# Patient Record
Sex: FEMALE | Race: WHITE | NOT HISPANIC OR LATINO | Employment: UNEMPLOYED | URBAN - METROPOLITAN AREA
[De-identification: names, ages, dates, MRNs, and addresses within clinical notes are randomized per-mention and may not be internally consistent; named-entity substitution may affect disease eponyms.]

---

## 2017-01-18 ENCOUNTER — TRANSCRIBE ORDERS (OUTPATIENT)
Dept: ADMINISTRATIVE | Facility: HOSPITAL | Age: 22
End: 2017-01-18

## 2017-01-18 ENCOUNTER — APPOINTMENT (OUTPATIENT)
Dept: LAB | Facility: HOSPITAL | Age: 22
End: 2017-01-18
Payer: COMMERCIAL

## 2017-01-18 DIAGNOSIS — J45.31 MILD PERSISTENT ASTHMA WITH EXACERBATION: Primary | ICD-10-CM

## 2017-01-18 DIAGNOSIS — J45.31 MILD PERSISTENT ASTHMA WITH EXACERBATION: ICD-10-CM

## 2017-01-18 DIAGNOSIS — J06.9 ACUTE UPPER RESPIRATORY INFECTIONS OF OTHER MULTIPLE SITES: ICD-10-CM

## 2017-01-18 DIAGNOSIS — J06.9 ACUTE UPPER RESPIRATORY INFECTIONS OF OTHER MULTIPLE SITES: Primary | ICD-10-CM

## 2017-01-18 LAB
FLUAV AG SPEC QL: DETECTED
FLUBV AG SPEC QL: ABNORMAL
RSV B RNA SPEC QL NAA+PROBE: ABNORMAL

## 2017-01-18 PROCEDURE — 86308 HETEROPHILE ANTIBODY SCREEN: CPT

## 2017-01-18 PROCEDURE — 36415 COLL VENOUS BLD VENIPUNCTURE: CPT

## 2017-01-18 PROCEDURE — 86738 MYCOPLASMA ANTIBODY: CPT

## 2017-01-18 PROCEDURE — 87798 DETECT AGENT NOS DNA AMP: CPT

## 2017-01-19 LAB — HETEROPH AB SER QL: NEGATIVE

## 2017-01-20 LAB
M PNEUMO IGG SER IA-ACNC: 435 U/ML (ref 0–99)
M PNEUMO IGM SER IA-ACNC: <770 U/ML (ref 0–769)

## 2017-08-02 ENCOUNTER — ALLSCRIPTS OFFICE VISIT (OUTPATIENT)
Dept: OTHER | Facility: OTHER | Age: 22
End: 2017-08-02

## 2017-09-25 ENCOUNTER — GENERIC CONVERSION - ENCOUNTER (OUTPATIENT)
Dept: OTHER | Facility: OTHER | Age: 22
End: 2017-09-25

## 2017-10-09 ENCOUNTER — ALLSCRIPTS OFFICE VISIT (OUTPATIENT)
Dept: OTHER | Facility: OTHER | Age: 22
End: 2017-10-09

## 2017-10-09 ENCOUNTER — GENERIC CONVERSION - ENCOUNTER (OUTPATIENT)
Dept: OTHER | Facility: OTHER | Age: 22
End: 2017-10-09

## 2017-10-09 DIAGNOSIS — Z01.419 ENCOUNTER FOR GYNECOLOGICAL EXAMINATION WITHOUT ABNORMAL FINDING: ICD-10-CM

## 2017-10-16 ENCOUNTER — GENERIC CONVERSION - ENCOUNTER (OUTPATIENT)
Dept: OTHER | Facility: OTHER | Age: 22
End: 2017-10-16

## 2017-10-28 NOTE — PROGRESS NOTES
Assessment    1  Oral contraceptive use (V25 41) (Z30 41)  2  Encounter for annual routine gynecological examination (V72 31) (Z01 419)    Plan  SocHx: Oral contraceptive use    · Changed: From  Junel 1/20 1-20 MG-MCG Oral Tablet TAKE 1 TABLET BY MOUTHDAILY AS DIRECTED To Junel 1/20 1-20 MG-MCG Oral Tablet TAKE 1 TABLET DAILY  Rx By: Marquita Parra; Dispense: 90 Days ; #:90 Tablet; Refill: 3;For: SocHx: Oral contraceptive use; DEONDRE = N; Sent To: [address]    Discussion/Summary  health maintenance visit healthy adult female Currently, she eats a healthy diet  cervical cancer screening is current Breast cancer screening: breast cancer screening is not indicated  Advice and education were given regarding contraception  The patient has the current Goals: Patient is at goal    Patient is able to Self-Care  PATIENT EDUCATION RECORD  Indication for Services: Annual gyn exam    Possible side effects of new medications were reviewed with the patient/guardian today  The treatment plan was reviewed with the patient/guardian  The patient/guardian understands and agrees with the treatment plan     Self Referrals: Yes      Chief Complaint    1  Visit For: Preventive General Multisystem Exam    History of Present Illness  GYN HM, Adult Female [facility]: The patient is being seen for a health maintenance and gynecology evaluation  General Health: The patient's health since the last visit is described as good  She has regular dental visits  -- She denies vision problems  -- She denies hearing loss  Immunizations status: up to date  Lifestyle:  She consumes a diverse and healthy diet  -- She does not have any weight concerns  -- She exercises regularly  -- She does not use tobacco -- She denies alcohol use  -- She denies drug use  Reproductive health: the patient is premenopausal--   she reports no menstrual problems  -- she uses contraception  For contraception, she uses oral contraception pills  -- she is sexually active  -- she denies prior pregnancies  Screening: cancer screening reviewed and current  metabolic screening reviewed and current  risk screening reviewed and current  Additional History: Gloria Chapman Antonieta Santos is new to our practice  She has been using Junel for contraception without any problems or adverse reactions  She is however, switch the like to have it on the Mirena  She has had no significant gyn history and only is being treated for asthma  Review of Systems   Constitutional: No fever, no chills, feels well, no tiredness, no recent weight gain or loss  ENT: no ear ache, no loss of hearing, no nosebleeds or nasal discharge, no sore throat or hoarseness  Cardiovascular: no complaints of slow or fast heart rate, no chest pain, no palpitations, no leg claudication or lower extremity edema  Respiratory: no complaints of shortness of breath, no wheezing, no dyspnea on exertion, no orthopnea or PND  Breasts: no complaints of breast pain, breast lump or nipple discharge  Gastrointestinal: no complaints of abdominal pain, no constipation, no nausea or diarrhea, no vomiting, no bloody stools  Genitourinary: no complaints of dysuria, no incontinence, no pelvic pain, no dysmenorrhea, no vaginal discharge or abnormal vaginal bleeding  Musculoskeletal: no complaints of arthralgia, no myalgia, no joint swelling or stiffness, no limb pain or swelling  Integumentary: no complaints of skin rash or lesion, no itching or dry skin, no skin wounds  Neurological: no complaints of headache, no confusion, no numbness or tingling, no dizziness or fainting  Active Problems  1  History of self breast exam  2  Migraine, unspecified, not intractable, without status migrainosus (346 90) (G43 909)  3  Need for prophylactic vaccination and inoculation against influenza (V04 81) (Z23)  4  Need for Tdap vaccination (V06 1) (Z23)  5   Oral contraceptive use (V25 41) (Z30 41)    Past Medical History   · History of  0 (V47 89)    Surgical History     · History of Oral Surgery Tooth Extraction [place] To[facility]    Family History  Mother    · Denied: Family history of mental disorder   · Denied: Family history of substance abuse  Father    · Denied: Family history of mental disorder   · Denied: Family history of substance abuse   · Family history of Living and Healthy  Grandparent    · Family history of asthma (V17 5) (Z82 5)    Social History     · Always uses seat belt   · Caffeine use (V49 89) (F15 90)   · 1 cup coffee daily   · Has no children   · Has smoke detectors   · Never a smoker   · No drug use   · Occasional alcohol use   · 1-2 beers on the weekend   · Oral contraceptive use (V25 41) (Z30 41)   · Single    Current Meds  1  Junel 1/20 1-20 MG-MCG Oral Tablet; TAKE 1 TABLET BY MOUTH DAILY AS DIRECTED; Therapy: [id number] to (Last Rx:[id number])  Requested for: 08Hzr6350 Ordered  2  Xopenex 0 31 MG/3ML Inhalation Nebulization Solution; Therapy: (AWIBOSPN:23WCH0884) to Recorded    Allergies  1  No Known Drug Allergies  2  Animal dander - Cats  3  No Known Food Allergies    Vitals   Recorded: 84DDJ1144 44:94ZY   Systolic 455, LUE, Sitting   Diastolic 68, LUE, Sitting   Height 5 ft 6 in   Weight 121 lb    BMI Calculated 19 53   BSA Calculated 1 62   LMP [id number]       Physical Exam   Constitutional  General appearance: No acute distress, well appearing and well nourished  Neck  Neck: Normal, supple, trachea midline, no masses  Thyroid: Normal, no thyromegaly  Genitourinary  External genitalia: Normal and no lesions appreciated  Vagina: Normal, no lesions or dryness appreciated  Urethra: Normal    Urethral meatus: Normal    Bladder: Normal, soft, non-tender and no prolapse or masses appreciated  Cervix: Normal, no palpable masses  Examination of the cervix revealed normal findings  A Pap smear was not performed  Bimanual exam findings include a patent cervical os    Uterus: Normal, non-tender, not enlarged, and no palpable masses  Adnexa/parametria: Normal, non-tender and no fullness or masses appreciated  Chest  Breasts: Normal and no dimpling or skin changes noted  Abdomen  Abdomen: Normal, non-tender, and no organomegaly noted  Liver and spleen: No hepatomegaly or splenomegaly  Examination for hernias: No hernias appreciated  Lymphatic  Palpation of lymph nodes in neck, axillae, groin and/or other locations: No lymphadenopathy or masses noted  Skin  Skin and subcutaneous tissue: Normal skin turgor and no rashes  Palpation of skin and subcutaneous tissue: Normal    Psychiatric  Orientation to person, place, and time: Normal    Mood and affect: Normal        Signatures   Electronically signed by :  Jaclyn Mary MD; Oct  9 2017  9:26AM EST                       (Author)

## 2018-01-13 VITALS
HEIGHT: 66 IN | WEIGHT: 125.25 LBS | RESPIRATION RATE: 12 BRPM | HEART RATE: 68 BPM | BODY MASS INDEX: 20.13 KG/M2 | SYSTOLIC BLOOD PRESSURE: 120 MMHG | DIASTOLIC BLOOD PRESSURE: 76 MMHG

## 2018-01-15 VITALS
HEIGHT: 66 IN | WEIGHT: 121 LBS | SYSTOLIC BLOOD PRESSURE: 120 MMHG | BODY MASS INDEX: 19.44 KG/M2 | DIASTOLIC BLOOD PRESSURE: 68 MMHG

## 2018-01-16 NOTE — MISCELLANEOUS
Message   Recorded as Task   Date: 10/09/2017 02:41 PM, Created By: Shawn Stone   Task Name: Care Coordination   Assigned To: Shelley Durbin   Regarding Patient: José Diaz, Status: In Progress   Comment:    Frank Arroyo - 09 Oct 2017 2:41 PM     TASK CREATED  Fredy Velasquez is interested in using a Mirena IUD   Jayy See - 10 Oct 2017 7:55 AM     TASK EDITED  Under [employer] insurance: Mirena iud is covered at 100%  No ded or oop  Patient to pay copay only at time of insertion   Jayy See - 10 Oct 2017 8:29 AM     TASK EDITED  lm for pt   Jayy See - 10 Oct 2017 8:29 AM     TASK IN PROGRESS   Jayy See - 16 Oct 2017 3:06 PM     TASK EDITED  Patient never called back        Active Problems    1  Encounter for annual routine gynecological examination (V72 31) (Z01 419)   2  History of self breast exam   3  Migraine, unspecified, not intractable, without status migrainosus (346 90) (G43 909)   4  Need for prophylactic vaccination and inoculation against influenza (V04 81) (Z23)   5  Need for Tdap vaccination (V06 1) (Z23)   6  Oral contraceptive use (V25 41) (Z30 41)    Current Meds   1  Junel 1/20 1-20 MG-MCG Oral Tablet; TAKE 1 TABLET DAILY; Therapy: 51PPH0075 to (Evaluate:04Oct2018)  Requested for: 25NNX2726; Last   Rx:09Oct2017 Ordered   2  Xopenex 0 31 MG/3ML Inhalation Nebulization Solution (Levalbuterol HCl); Therapy: (BOGVSGSS:[id number]) to Recorded    Allergies    1  No Known Drug Allergies    2  Animal dander - Cats   3  No Known Food Allergies    Signatures   Electronically signed by :  Letitia Hashimoto, ; Oct 16 2017  3:06PM EST                       (Author)

## 2018-01-16 NOTE — MISCELLANEOUS
Message   Date: 25 Sep 2017 11:05 AM EST, Recorded By: Heather Kearns For: Jose Taylor   Reason: Other   New patient's mother called to ask if patient can get a flu shot at her appointment on 10/9/17  Advised usually only given to OB patient's but should ask the day of her appointment  Active Problems    1  Migraine, unspecified, not intractable, without status migrainosus (346 90) (G43 909)   2  Need for prophylactic vaccination and inoculation against influenza (V04 81) (Z23)   3  Need for Tdap vaccination (V06 1) (Z23)   4  Oral contraceptive use (V25 41) (Z30 41)    Current Meds   1  Junel 1/20 1-20 MG-MCG Oral Tablet; TAKE 1 TABLET BY MOUTH DAILY AS   DIRECTED; Therapy: 83VQM8946 to (Last Rx:[id number])  Requested for: 44Xcs6714 Ordered   2  Xopenex 0 31 MG/3ML Inhalation Nebulization Solution (Levalbuterol HCl); Therapy: (HCHXHLDR:29BLD1000) to Recorded    Allergies    1  No Known Drug Allergies    2  Animal dander - Cats   3   No Known Food Allergies    Signatures   Electronically signed by : Brandon Queen, ; Sep 25 2017 11:11AM EST                       (Author)

## 2018-01-22 VITALS — BODY MASS INDEX: 19.38 KG/M2 | WEIGHT: 120.6 LBS | HEIGHT: 66 IN

## 2018-02-19 ENCOUNTER — TELEPHONE (OUTPATIENT)
Dept: FAMILY MEDICINE CLINIC | Facility: CLINIC | Age: 23
End: 2018-02-19

## 2018-02-19 DIAGNOSIS — R51.9 CHRONIC NONINTRACTABLE HEADACHE, UNSPECIFIED HEADACHE TYPE: Primary | ICD-10-CM

## 2018-02-19 DIAGNOSIS — G89.29 CHRONIC NONINTRACTABLE HEADACHE, UNSPECIFIED HEADACHE TYPE: Primary | ICD-10-CM

## 2018-02-19 NOTE — TELEPHONE ENCOUNTER
Mom called, said she spoke to you about another order for Dr Dandre Arreaga  Please enter order, then patient can make appointment   Thank you

## 2018-03-16 ENCOUNTER — OFFICE VISIT (OUTPATIENT)
Dept: FAMILY MEDICINE CLINIC | Facility: CLINIC | Age: 23
End: 2018-03-16
Payer: COMMERCIAL

## 2018-03-16 VITALS
DIASTOLIC BLOOD PRESSURE: 62 MMHG | SYSTOLIC BLOOD PRESSURE: 110 MMHG | WEIGHT: 122.7 LBS | HEART RATE: 84 BPM | HEIGHT: 66 IN | BODY MASS INDEX: 19.72 KG/M2 | RESPIRATION RATE: 14 BRPM

## 2018-03-16 DIAGNOSIS — G43.009 MIGRAINE WITHOUT AURA AND WITHOUT STATUS MIGRAINOSUS, NOT INTRACTABLE: ICD-10-CM

## 2018-03-16 DIAGNOSIS — L72.3 SEBACEOUS CYST: ICD-10-CM

## 2018-03-16 DIAGNOSIS — E55.9 MILD VITAMIN D DEFICIENCY: ICD-10-CM

## 2018-03-16 DIAGNOSIS — R53.83 FATIGUE, UNSPECIFIED TYPE: Primary | ICD-10-CM

## 2018-03-16 PROCEDURE — 99214 OFFICE O/P EST MOD 30 MIN: CPT | Performed by: PHYSICIAN ASSISTANT

## 2018-03-16 RX ORDER — NORETHINDRONE ACETATE AND ETHINYL ESTRADIOL 1; .02 MG/1; MG/1
1 TABLET ORAL DAILY
COMMUNITY
Start: 2017-08-18 | End: 2018-07-26 | Stop reason: SDUPTHER

## 2018-03-16 RX ORDER — ALBUTEROL SULFATE 90 UG/1
2 AEROSOL, METERED RESPIRATORY (INHALATION) EVERY 4 HOURS
COMMUNITY
Start: 2014-06-13 | End: 2020-06-08

## 2018-03-16 RX ORDER — LEVALBUTEROL INHALATION SOLUTION 0.31 MG/3ML
SOLUTION RESPIRATORY (INHALATION)
COMMUNITY
End: 2018-10-22

## 2018-03-16 NOTE — PROGRESS NOTES
Assessment/Plan:    1  Fatigue, unspecified type    - reassurance, most likely due to stress, will check labs for completeness  - CBC and differential; Future  - TSH, 3rd generation with T4 reflex; Future  - Comprehensive metabolic panel; Future    2  Mild vitamin D deficiency    - see above  - Vitamin D 25 hydroxy; Future    3  Migraine without aura and without status migrainosus, not intractable    - per patient request, had already made before appt, [facility]/11/[facility]  - Ambulatory referral to Neurology; Future    4  Sebaceous cyst    - reassurance, monitor if any changes or pain will refer to general surgery    F/u as needed          Subjective:   Chief Complaint   Patient presents with    Neck cyst      Patient ID: Sahil Lawrence is a 20 y o  female  Bump on back of neck for years, recently getting bigger  Not sore, denies discharge  Has been tired past 4-5 months  Waking up still tired, getting 7-8 hours, wakes up wanting to go back to bed  Sometimes with headache  Has an appt to see neurologist because headaches had gotten worse  Seeing Dr Brad Grover on 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  Moving to [employer]  Periods normal, eating poorly, getting regular exercise  The following portions of the patient's history were reviewed and updated as appropriate: allergies, current medications, past family history, past medical history, past social history, past surgical history and problem list     No past medical history on file  Past Surgical History:   Procedure Laterality Date    WISDOM TOOTH EXTRACTION       Family History   Problem Relation Age of Onset    No Known Problems Father     Asthma Other     Mental illness Neg Hx     Substance Abuse Neg Hx      Social History     Social History    Marital status: Single     Spouse name: N/A    Number of children: 0    Years of education: N/A     Occupational History    Not on file       Social History Main Topics  Smoking status: Never Smoker    Smokeless tobacco: Never Used    Alcohol use Yes      Comment: occassional 1-2 beers on the weekend     Drug use: No    Sexual activity: Not on file     Other Topics Concern    Not on file     Social History Narrative    Always uses seat belts    Caffeine use 1 cup coffee daily        Current Outpatient Prescriptions:     albuterol (PROVENTIL HFA,VENTOLIN HFA) 90 mcg/act inhaler, Inhale 2 puffs every 4 (four) hours, Disp: , Rfl:     norethindrone-ethinyl estradiol (JUNEL 1/20) 1-20 MG-MCG per tablet, Take 1 tablet by mouth daily, Disp: , Rfl:     levalbuterol (XOPENEX) 0 31 mg/3 mL nebulizer solution, Inhale, Disp: , Rfl:     Review of Systems          Objective:    Vitals:    03/16/18 0910   BP: 110/62   BP Location: Right arm   Patient Position: Sitting   Cuff Size: Standard   Pulse: 84   Resp: 14   Weight: 55 7 kg (122 lb 11 2 oz)   Height: 5' 6" (1 676 m)        Physical Exam   Constitutional: She is oriented to person, place, and time  She appears well-developed and well-nourished  Cardiovascular: Normal rate, regular rhythm and normal heart sounds  Pulmonary/Chest: Effort normal and breath sounds normal    Lymphadenopathy:     She has no cervical adenopathy  Neurological: She is alert and oriented to person, place, and time  Skin:   Left side neck posterior soft freely movable mass about 3 mm with central necrotic plug   Psychiatric: She has a normal mood and affect

## 2018-04-09 ENCOUNTER — TELEPHONE (OUTPATIENT)
Dept: NEUROLOGY | Facility: CLINIC | Age: 23
End: 2018-04-09

## 2018-04-10 ENCOUNTER — LAB (OUTPATIENT)
Dept: LAB | Facility: CLINIC | Age: 23
End: 2018-04-10
Payer: COMMERCIAL

## 2018-04-10 ENCOUNTER — TRANSCRIBE ORDERS (OUTPATIENT)
Dept: LAB | Facility: CLINIC | Age: 23
End: 2018-04-10

## 2018-04-10 ENCOUNTER — OFFICE VISIT (OUTPATIENT)
Dept: NEUROLOGY | Facility: CLINIC | Age: 23
End: 2018-04-10
Payer: COMMERCIAL

## 2018-04-10 VITALS
SYSTOLIC BLOOD PRESSURE: 110 MMHG | WEIGHT: 122 LBS | HEIGHT: 66 IN | BODY MASS INDEX: 19.61 KG/M2 | HEART RATE: 96 BPM | DIASTOLIC BLOOD PRESSURE: 64 MMHG

## 2018-04-10 DIAGNOSIS — G43.009 MIGRAINE WITHOUT AURA AND WITHOUT STATUS MIGRAINOSUS, NOT INTRACTABLE: ICD-10-CM

## 2018-04-10 DIAGNOSIS — R53.83 FATIGUE, UNSPECIFIED TYPE: ICD-10-CM

## 2018-04-10 DIAGNOSIS — R51.9 MORNING HEADACHE: Primary | ICD-10-CM

## 2018-04-10 DIAGNOSIS — E55.9 MILD VITAMIN D DEFICIENCY: ICD-10-CM

## 2018-04-10 LAB
25(OH)D3 SERPL-MCNC: 14.6 NG/ML (ref 30–100)
ALBUMIN SERPL BCP-MCNC: 4 G/DL (ref 3.5–5)
ALP SERPL-CCNC: 51 U/L (ref 46–116)
ALT SERPL W P-5'-P-CCNC: 25 U/L (ref 12–78)
ANION GAP SERPL CALCULATED.3IONS-SCNC: 6 MMOL/L (ref 4–13)
AST SERPL W P-5'-P-CCNC: 12 U/L (ref 5–45)
BASOPHILS # BLD AUTO: 0.02 THOUSANDS/ΜL (ref 0–0.1)
BASOPHILS NFR BLD AUTO: 0 % (ref 0–1)
BILIRUB SERPL-MCNC: 0.2 MG/DL (ref 0.2–1)
BUN SERPL-MCNC: 11 MG/DL (ref 5–25)
CALCIUM SERPL-MCNC: 8.7 MG/DL
CHLORIDE SERPL-SCNC: 104 MMOL/L (ref 100–108)
CO2 SERPL-SCNC: 29 MMOL/L (ref 21–32)
CREAT SERPL-MCNC: 0.73 MG/DL (ref 0.6–1.3)
EOSINOPHIL # BLD AUTO: 0.19 THOUSAND/ΜL (ref 0–0.61)
EOSINOPHIL NFR BLD AUTO: 2 % (ref 0–6)
ERYTHROCYTE [DISTWIDTH] IN BLOOD BY AUTOMATED COUNT: 12.4 % (ref 11.6–15.1)
GFR SERPL CREATININE-BSD FRML MDRD: 116 ML/MIN/1.73SQ M
GLUCOSE SERPL-MCNC: 94 MG/DL (ref 65–140)
HCT VFR BLD AUTO: 43.7 % (ref 34.8–46.1)
HGB BLD-MCNC: 14.4 G/DL (ref 11.5–15.4)
LYMPHOCYTES # BLD AUTO: 3.27 THOUSANDS/ΜL (ref 0.6–4.47)
LYMPHOCYTES NFR BLD AUTO: 37 % (ref 14–44)
MCH RBC QN AUTO: 29.3 PG (ref 26.8–34.3)
MCHC RBC AUTO-ENTMCNC: 33 G/DL (ref 31.4–37.4)
MCV RBC AUTO: 89 FL (ref 82–98)
MONOCYTES # BLD AUTO: 0.47 THOUSAND/ΜL (ref 0.17–1.22)
MONOCYTES NFR BLD AUTO: 5 % (ref 4–12)
NEUTROPHILS # BLD AUTO: 4.83 THOUSANDS/ΜL (ref 1.85–7.62)
NEUTS SEG NFR BLD AUTO: 56 % (ref 43–75)
PLATELET # BLD AUTO: 262 THOUSANDS/UL (ref 149–390)
PMV BLD AUTO: 12.2 FL (ref 8.9–12.7)
POTASSIUM SERPL-SCNC: 3.9 MMOL/L (ref 3.5–5.3)
PROT SERPL-MCNC: 7.6 G/DL (ref 6.4–8.2)
RBC # BLD AUTO: 4.92 MILLION/UL (ref 3.81–5.12)
SODIUM SERPL-SCNC: 139 MMOL/L (ref 136–145)
TSH SERPL DL<=0.05 MIU/L-ACNC: 2.05 UIU/ML (ref 0.36–3.74)
WBC # BLD AUTO: 8.78 THOUSAND/UL (ref 4.31–10.16)

## 2018-04-10 PROCEDURE — 99244 OFF/OP CNSLTJ NEW/EST MOD 40: CPT | Performed by: PSYCHIATRY & NEUROLOGY

## 2018-04-10 PROCEDURE — 84443 ASSAY THYROID STIM HORMONE: CPT

## 2018-04-10 PROCEDURE — 36415 COLL VENOUS BLD VENIPUNCTURE: CPT

## 2018-04-10 PROCEDURE — 82306 VITAMIN D 25 HYDROXY: CPT

## 2018-04-10 PROCEDURE — 85025 COMPLETE CBC W/AUTO DIFF WBC: CPT

## 2018-04-10 PROCEDURE — 80053 COMPREHEN METABOLIC PANEL: CPT

## 2018-04-10 RX ORDER — RIZATRIPTAN BENZOATE 10 MG/1
TABLET, ORALLY DISINTEGRATING ORAL
Qty: 9 TABLET | Refills: 3 | Status: SHIPPED | OUTPATIENT
Start: 2018-04-10 | End: 2018-04-10 | Stop reason: SDUPTHER

## 2018-04-10 RX ORDER — RIZATRIPTAN BENZOATE 10 MG/1
TABLET, ORALLY DISINTEGRATING ORAL
Qty: 9 TABLET | Refills: 0 | Status: SHIPPED | OUTPATIENT
Start: 2018-04-10

## 2018-04-10 NOTE — PROGRESS NOTES
Patient ID: Angeles Guillory is a 20 y o  female  Assessment/Plan:    No problem-specific Assessment & Plan notes found for this encounter  Diagnoses and all orders for this visit:    Morning headache  -     Ambulatory referral to Sleep Medicine; Future    Migraine without aura and without status migrainosus, not intractable  -     Ambulatory referral to Neurology    -     rizatriptan (MAXALT-MLT) 10 MG disintegrating tablet; Take 1 tab at migraine onset, and then can repeat once in 24 hours  Max 2 tabs in 24 hours  Max 2 days a week  - Preventative: Mag ox +/- B2        - Rx alternatives include topamax, gabapentin, protriptyline  Would avoid beta blockers given asthma history  Fatigue, unspecified type  -     Ambulatory referral to Sleep Medicine; Future         Subjective:    HPI    Ms  Angeles Guillory is a pleasant 22 seen in consultation for headache states recalls having them even when she was in elementary school  States worse frequency over time  States headaches can occur anywhere- temporal occipital frontal with no pattern  Associated symptoms: Nausea and vomiting with migraines, photophobia and mild phonophobia,   Denies vertigo or light headedness  Denies aura  Frequency: Wakes up with headaches almost every other day and typically they go away and once a week or two weeks this becomes a severe migraine  Duration: 2 hours for normal headache and migraines >4 hours  Medications tried: Motrin once or twice a week and sometimes helps significantly and sometimes not at all  Imitrex initially helped but not afterward  Naproxen did not helpful  Tried elavil for at least a month- states at least no adverse effects  Did see [facility] neurology 2015 and I have reviewed their notes in care everywhere  She has tried over the counter powder medication which she states main ingredient was chilli powder and this was helpful however they have stopped carrying it    No family history of migraines  Family history of maternal grandmother with stroke at and older age  Other health history of asthma- well controlled  Does report excessive daytime fatigue on awakening, denies snoring or apnea symptoms  Has seen ENT and cleared from their standpoint other than taking allergy medications at times  Blood work pending ordered per PCP  CT head without contrast normal years ago per her  The following portions of the patient's history were reviewed and updated as appropriate: allergies, current medications, past family history, past medical history, past social history, past surgical history and problem list          Objective:    Blood pressure 110/64, pulse 96, height 5' 6" (1 676 m), weight 55 3 kg (122 lb), not currently breastfeeding  Physical Exam   Constitutional: She is oriented to person, place, and time  She appears well-developed and well-nourished  HENT:   Head: Normocephalic and atraumatic  Eyes: EOM are normal  Pupils are equal, round, and reactive to light  Neck: Normal range of motion  Cardiovascular: Normal rate and regular rhythm  Pulmonary/Chest: Effort normal    Abdominal: Soft  Musculoskeletal: She exhibits no tenderness  Neurological: She is alert and oriented to person, place, and time  She has normal strength and normal reflexes  Gait and coordination normal    Nursing note and vitals reviewed  Neurological Exam    Mental Status  The patient is alert and oriented to person, place, time, and situation  Her recent and remote memory are normal  She has no dysarthria  She is able to name object, read and repeat  She has normal attention span and concentration  She follows multi-step commands  She has a normal fund of knowledge      Cranial Nerves    CN II: The patient's visual acuity and visual fields are normal   CN III, IV, VI: The patient's pupils are equally round and reactive to light and ocular movements are normal   CN V: The patient has normal facial sensation  CN VII:  The patient has symmetric facial movement  CN VIII:  The patient's hearing is normal   CN IX, X: The patient has symmetric palate movement and normal gag reflex  CN XI: The patient's shoulder shrug strength is normal   CN XII: The patient's tongue is midline without atrophy or fasciculations  Motor  The patient has normal muscle bulk throughout  Her overall muscle tone is normal throughout  Her strength is 5/5 throughout all four extremities  Sensory  The patient's sensation is normal in all four extremities to light touch, temperature and vibration  She has no right-sided and no left-sided hemispatial neglect  Reflexes  Deep tendon reflexes are 2+ and symmetric in all four extremities with downgoing toes bilaterally  Gait and Coordination  The patient has normal gait and station  She has normal tandem gait  Romberg's sign is negative  She has normal coordination bilaterally  ROS:    Review of Systems   Constitutional: Positive for fatigue  HENT: Negative  Eyes: Negative  Respiratory: Negative  Cardiovascular: Negative  Gastrointestinal: Negative  Endocrine: Negative  Genitourinary: Negative  Musculoskeletal: Negative  Skin: Negative  Neurological: Positive for headaches  Psychiatric/Behavioral: Negative

## 2018-07-24 ENCOUNTER — TELEPHONE (OUTPATIENT)
Dept: FAMILY MEDICINE CLINIC | Facility: CLINIC | Age: 23
End: 2018-07-24

## 2018-07-24 NOTE — TELEPHONE ENCOUNTER
I could order the lab work but she should really probably establish with a PCP up there just to be thorough  With mono you can have an enlarged spleen and you can also get hepatitis  Better to have a PCP follow her for this  If she is having trouble getting into a PCP have her call back

## 2018-07-24 NOTE — TELEPHONE ENCOUNTER
Spoke with mom  She will try to get patient established with someone in [place] but our insurance will make this very difficult  Mom will get back to us if she needs more help with this

## 2018-07-24 NOTE — TELEPHONE ENCOUNTER
Patient has moved to [place]  She was seen at an urgent care on Friday  They go not do labs but told patient by all her symptoms they believe she has mono and patient should contact her family dr to order the labs for patient  Would you be willing to do this?

## 2018-07-26 ENCOUNTER — TRANSCRIBE ORDERS (OUTPATIENT)
Dept: ADMINISTRATIVE | Facility: HOSPITAL | Age: 23
End: 2018-07-26

## 2018-07-26 DIAGNOSIS — G47.9 SLEEP DISORDER: Primary | ICD-10-CM

## 2018-07-26 DIAGNOSIS — Z30.41 ENCOUNTER FOR SURVEILLANCE OF CONTRACEPTIVE PILLS: Primary | ICD-10-CM

## 2018-07-26 RX ORDER — NORETHINDRONE ACETATE AND ETHINYL ESTRADIOL 1; .02 MG/1; MG/1
1 TABLET ORAL DAILY
Qty: 73 TABLET | Refills: 0 | Status: SHIPPED | OUTPATIENT
Start: 2018-07-26 | End: 2018-10-22 | Stop reason: SDUPTHER

## 2018-10-22 ENCOUNTER — OFFICE VISIT (OUTPATIENT)
Dept: SLEEP CENTER | Facility: CLINIC | Age: 23
End: 2018-10-22
Payer: COMMERCIAL

## 2018-10-22 ENCOUNTER — OFFICE VISIT (OUTPATIENT)
Dept: FAMILY MEDICINE CLINIC | Facility: CLINIC | Age: 23
End: 2018-10-22
Payer: COMMERCIAL

## 2018-10-22 ENCOUNTER — APPOINTMENT (OUTPATIENT)
Dept: LAB | Facility: HOSPITAL | Age: 23
End: 2018-10-22
Payer: COMMERCIAL

## 2018-10-22 ENCOUNTER — ANNUAL EXAM (OUTPATIENT)
Dept: OBGYN CLINIC | Facility: CLINIC | Age: 23
End: 2018-10-22
Payer: COMMERCIAL

## 2018-10-22 VITALS
HEART RATE: 80 BPM | HEIGHT: 66 IN | DIASTOLIC BLOOD PRESSURE: 74 MMHG | WEIGHT: 121.6 LBS | BODY MASS INDEX: 19.54 KG/M2 | RESPIRATION RATE: 17 BRPM | SYSTOLIC BLOOD PRESSURE: 110 MMHG

## 2018-10-22 VITALS
DIASTOLIC BLOOD PRESSURE: 70 MMHG | SYSTOLIC BLOOD PRESSURE: 124 MMHG | HEIGHT: 66 IN | BODY MASS INDEX: 19.83 KG/M2 | WEIGHT: 123.4 LBS

## 2018-10-22 VITALS
WEIGHT: 123 LBS | HEIGHT: 66 IN | DIASTOLIC BLOOD PRESSURE: 70 MMHG | SYSTOLIC BLOOD PRESSURE: 106 MMHG | BODY MASS INDEX: 19.77 KG/M2 | HEART RATE: 64 BPM

## 2018-10-22 DIAGNOSIS — G43.009 MIGRAINE WITHOUT AURA AND WITHOUT STATUS MIGRAINOSUS, NOT INTRACTABLE: ICD-10-CM

## 2018-10-22 DIAGNOSIS — R59.9 ADENOPATHY: ICD-10-CM

## 2018-10-22 DIAGNOSIS — Z23 NEED FOR PROPHYLACTIC VACCINATION AND INOCULATION AGAINST INFLUENZA: Primary | ICD-10-CM

## 2018-10-22 DIAGNOSIS — G47.10 HYPERSOMNIA: ICD-10-CM

## 2018-10-22 DIAGNOSIS — G47.30 SLEEP-RELATED BREATHING DISORDER: ICD-10-CM

## 2018-10-22 DIAGNOSIS — R51.9 HEADACHE UPON AWAKENING: Primary | ICD-10-CM

## 2018-10-22 DIAGNOSIS — F41.9 ANXIETY: ICD-10-CM

## 2018-10-22 DIAGNOSIS — G47.9 SLEEP DISORDER: ICD-10-CM

## 2018-10-22 DIAGNOSIS — M25.50 ARTHRALGIA, UNSPECIFIED JOINT: ICD-10-CM

## 2018-10-22 DIAGNOSIS — J45.20 MILD INTERMITTENT ASTHMA WITHOUT COMPLICATION: ICD-10-CM

## 2018-10-22 DIAGNOSIS — G47.00 INSOMNIA, UNSPECIFIED TYPE: ICD-10-CM

## 2018-10-22 DIAGNOSIS — Z01.419 ENCOUNTER FOR GYNECOLOGICAL EXAMINATION WITHOUT ABNORMAL FINDING: Primary | ICD-10-CM

## 2018-10-22 DIAGNOSIS — Z30.41 ENCOUNTER FOR SURVEILLANCE OF CONTRACEPTIVE PILLS: ICD-10-CM

## 2018-10-22 LAB
ALBUMIN SERPL BCP-MCNC: 3.8 G/DL (ref 3.5–5)
ALP SERPL-CCNC: 54 U/L (ref 46–116)
ALT SERPL W P-5'-P-CCNC: 16 U/L (ref 12–78)
ANION GAP SERPL CALCULATED.3IONS-SCNC: 7 MMOL/L (ref 4–13)
AST SERPL W P-5'-P-CCNC: 11 U/L (ref 5–45)
BASOPHILS # BLD AUTO: 0.03 THOUSANDS/ΜL (ref 0–0.1)
BASOPHILS NFR BLD AUTO: 0 % (ref 0–1)
BILIRUB SERPL-MCNC: 0.37 MG/DL (ref 0.2–1)
BUN SERPL-MCNC: 7 MG/DL (ref 5–25)
CALCIUM SERPL-MCNC: 8.4 MG/DL (ref 8.3–10.1)
CHLORIDE SERPL-SCNC: 106 MMOL/L (ref 100–108)
CO2 SERPL-SCNC: 26 MMOL/L (ref 21–32)
CREAT SERPL-MCNC: 0.85 MG/DL (ref 0.6–1.3)
EOSINOPHIL # BLD AUTO: 0.29 THOUSAND/ΜL (ref 0–0.61)
EOSINOPHIL NFR BLD AUTO: 3 % (ref 0–6)
ERYTHROCYTE [DISTWIDTH] IN BLOOD BY AUTOMATED COUNT: 12.5 % (ref 11.6–15.1)
ERYTHROCYTE [SEDIMENTATION RATE] IN BLOOD: 3 MM/HOUR (ref 0–20)
GFR SERPL CREATININE-BSD FRML MDRD: 96 ML/MIN/1.73SQ M
GLUCOSE SERPL-MCNC: 47 MG/DL (ref 65–140)
HCT VFR BLD AUTO: 45 % (ref 34.8–46.1)
HGB BLD-MCNC: 14.5 G/DL (ref 11.5–15.4)
IMM GRANULOCYTES # BLD AUTO: 0.02 THOUSAND/UL (ref 0–0.2)
IMM GRANULOCYTES NFR BLD AUTO: 0 % (ref 0–2)
LYMPHOCYTES # BLD AUTO: 3.81 THOUSANDS/ΜL (ref 0.6–4.47)
LYMPHOCYTES NFR BLD AUTO: 40 % (ref 14–44)
MCH RBC QN AUTO: 29.5 PG (ref 26.8–34.3)
MCHC RBC AUTO-ENTMCNC: 32.2 G/DL (ref 31.4–37.4)
MCV RBC AUTO: 92 FL (ref 82–98)
MONOCYTES # BLD AUTO: 0.57 THOUSAND/ΜL (ref 0.17–1.22)
MONOCYTES NFR BLD AUTO: 6 % (ref 4–12)
NEUTROPHILS # BLD AUTO: 4.84 THOUSANDS/ΜL (ref 1.85–7.62)
NEUTS SEG NFR BLD AUTO: 51 % (ref 43–75)
NRBC BLD AUTO-RTO: 0 /100 WBCS
PLATELET # BLD AUTO: 253 THOUSANDS/UL (ref 149–390)
PMV BLD AUTO: 12.2 FL (ref 8.9–12.7)
POTASSIUM SERPL-SCNC: 3.4 MMOL/L (ref 3.5–5.3)
PROT SERPL-MCNC: 7.4 G/DL (ref 6.4–8.2)
RBC # BLD AUTO: 4.91 MILLION/UL (ref 3.81–5.12)
SODIUM SERPL-SCNC: 139 MMOL/L (ref 136–145)
TSH SERPL DL<=0.05 MIU/L-ACNC: 0.97 UIU/ML (ref 0.36–3.74)
WBC # BLD AUTO: 9.56 THOUSAND/UL (ref 4.31–10.16)

## 2018-10-22 PROCEDURE — 99395 PREV VISIT EST AGE 18-39: CPT | Performed by: OBSTETRICS & GYNECOLOGY

## 2018-10-22 PROCEDURE — 99395 PREV VISIT EST AGE 18-39: CPT | Performed by: PHYSICIAN ASSISTANT

## 2018-10-22 PROCEDURE — 80053 COMPREHEN METABOLIC PANEL: CPT

## 2018-10-22 PROCEDURE — 99244 OFF/OP CNSLTJ NEW/EST MOD 40: CPT | Performed by: INTERNAL MEDICINE

## 2018-10-22 PROCEDURE — 90471 IMMUNIZATION ADMIN: CPT

## 2018-10-22 PROCEDURE — 84443 ASSAY THYROID STIM HORMONE: CPT

## 2018-10-22 PROCEDURE — 36415 COLL VENOUS BLD VENIPUNCTURE: CPT

## 2018-10-22 PROCEDURE — 86038 ANTINUCLEAR ANTIBODIES: CPT

## 2018-10-22 PROCEDURE — 86430 RHEUMATOID FACTOR TEST QUAL: CPT

## 2018-10-22 PROCEDURE — 85652 RBC SED RATE AUTOMATED: CPT

## 2018-10-22 PROCEDURE — 85025 COMPLETE CBC W/AUTO DIFF WBC: CPT

## 2018-10-22 PROCEDURE — 86618 LYME DISEASE ANTIBODY: CPT

## 2018-10-22 PROCEDURE — 90688 IIV4 VACCINE SPLT 0.5 ML IM: CPT

## 2018-10-22 RX ORDER — NORETHINDRONE ACETATE AND ETHINYL ESTRADIOL 1; .02 MG/1; MG/1
1 TABLET ORAL DAILY
Qty: 84 TABLET | Refills: 3 | Status: SHIPPED | OUTPATIENT
Start: 2018-10-22 | End: 2019-11-21 | Stop reason: SDUPTHER

## 2018-10-22 NOTE — PROGRESS NOTES
Review of Systems      Genitourinary none   Cardiology none   Gastrointestinal none   Neurology frequent headaches and awaken with headache   Constitutional fatigue   Integumentary none   Psychiatry anxiety   Musculoskeletal none   Pulmonary none   ENT none   Endocrine none   Hematological none

## 2018-10-22 NOTE — PROGRESS NOTES
Consultation - Sleep Center   Maryl Libman  24 y o  female  Sathya Power  [facility]:[id number]    Physician Requesting Consult: Bindu Li 11, DO     Reason for Consult : At your kind request I saw this patient for initial sleep evaluation today  She has frequent migraines and is also awakening with headaches    Other Complaints:  Always tired  PFSH, Problem List, Medications & Allergies were reviewed in EMR  She  has no past medical history on file  She has a current medication list which includes the following prescription(s): albuterol, norethindrone-ethinyl estradiol, and rizatriptan  HPI:  She was diagnosed with migraine several years ago  She continues to report severe migraine headaches around 5 times a month  In the past 1 year, she is also awakening with headaches that last approximately an hour but sometimes can evolving to migraine  Her bed partner does not report any snoring and she is not aware of breathing difficulties during sleep  However, she frequently awakens with sore throat and regularly with dry mouth  Restless Leg Syndrome: reports no suggestive symptoms    Parasomnia activity: no features reported   Sleep Routine:  She works 2nd shift  Typical Bedtime:  2:00 a m  Gets OOB:  10-11 a m  TIB:  8 hr or more Pt Estimated TST@:  7 hrs Sleep latency:  upto 60 minutes  She denied racing thoughts but volunteered I have very bad anxiety and goes to sleep with the TV on  Sleep Interruptions: infrequent x/night and is able to fall back asleep  Awakens: spontaneously feeling never rested  She has Excessive Daytime Sleepiness and naps when she has the opportunity  She typically naps for approximately an hour 2 to 3 times a week  [place] Sleepiness Scale rated at Total score: 12 /24  Habits: reports that she has never smoked  She has never used smokeless tobacco , reports that she drinks alcohol ,  reports that she does not use drugs  ,Caffeine use: excessive until 6: 00 p m , Exercise routine: regular most days of the week  Family History: Negative for sleep disturbance  ROS: reviewed & as attached  Significant for anxiety for which she is seeking counseling  She denied nasal symptoms  She has exercise induced asthma that is controlled  EXAM:  key  [x] system is Normal  [] see notes  VITALS      /70   Pulse 64   Ht 5' 6" (1 676 m)   Wt 55 8 kg (123 lb)   BMI 19 85 kg/m²     GENERAL[x]  Well groomed female, well appearing, in no apparent distress  PSYCH      Alert and cooperative  Mental state appears normal  Judgement & Insight good   EXTM/SKN[x]      No pedal edema  Color and hydration are good  Skin is warm and dry  HEAD       [x]     Craniofacial anatomy: slight overjet  and narrow facies  Sinuses non- tender  TMJ Normal   EYES       [x] EOMI   LYMPH No Cervical, Submandibular Lymhadenopathy    Neck         []  Neck Circumference: 32 cm, is lean; no abnormal masses or  Thyroid is normal  Trachea is central     Nasal        []  Airway airflow is reduced and narrow nares Septum is central, Mucous membranes appear normal  Turbinates are normal  There is no rhinorrhea; No PND  Oral          []    Airway       crowded Modified Mallampati class III (soft and hard palate and base of uvula visible)  Palate:  redundant soft palate, high hard palate and narrow hard palateTonsils: no hypertrophy  Teeth normal      CVS         [x]  Heart sounds are regular, No murmurs  RESP       [x]  Effort is normal  Air entry good bilaterally  No wheezes  No rales  ABD         [x]  obese, soft & non-tender  CNS         [x]  WNL Speech is clear & coherant  Grossly intact  Rombergs Negative  MSK         [x]  Muscle bulk, tone and power WNL Gait:normal      IMPRESSION: Primary Sleep/Secondary(to Medical or Psych conditions) & comorbidities   1  Headache upon awakening  Diagnostic Sleep Study    CPAP Study   2   Hypersomnia  Diagnostic Sleep Study    CPAP Study   3  Insomnia, unspecified type     4  Sleep-related breathing disorder  Diagnostic Sleep Study    CPAP Study   5  Anxiety     6  Migraine without aura and without status migrainosus, not intractable     7  Sleep disorder  Ambulatory referral to Sleep Medicine    Ambulatory referral to Sleep Medicine   8  Mild intermittent asthma without complication          PLAN:   1  Comprehensive counseling was provided on pathophysiology, diagnostic strategies & treatment options; effects on symptoms and comorbidities; risks of inadequate therapy; costs and insurance aspects  2  I advised on weight reduction, avoiding sleeping supine, using alcohol or sedating medications close to bed time and on safe driving practices  3  Cognitive behavioral therapy was initiated with advise on Sleep Hygiene and behavioral techniques to manage Insomnia  Specifically avoiding watching TV in bed, avoiding caffeine use after 3:00 p m , limiting time in bed and on relaxation techniques  4  Nocturnal polysomnography is indicated and a diagnostic study will be scheduled  5  Patient is willing to try Positive airway pressure therapy and will be scheduled for a titration study if indicated  6  Follow-up will be scheduled after the studies to review results, further details of treatment options and to initiate/adjust therapy  Thank you for allowing me to participate in the care of this patient  I will keep you apprised of developments      Sincerely,     Authenticated electronically by Ruth Smart MD   on 97/80/57   Board Certified Specialist

## 2018-10-22 NOTE — PROGRESS NOTES
CC:  Annual exam    HPI: Maryl Libman presents for routine yearly visit  She is doing well with no complaints or concerns at this time  She is using Junel 1/20 for contraception  She has no complaints or adverse reactions and wishes to continue  Past Medical History:  History reviewed  No pertinent past medical history  Past Surgical History:  Past Surgical History:   Procedure Laterality Date    WISDOM TOOTH EXTRACTION         Past OB/Gyn History:  Menstrual cycles every 28 days, with 2-3 days of light bleeding  Denies any history of sexually transmitted infection  No history of abnormal pap smears  Her last pap smear was 2016  ALLERGIES:   Allergies   Allergen Reactions    Other Allergic Rhinitis, Sneezing and Wheezing     Cat dander       MEDS:   Current Outpatient Prescriptions:     albuterol (PROVENTIL HFA,VENTOLIN HFA) 90 mcg/act inhaler    rizatriptan (MAXALT-MLT) 10 MG disintegrating tablet    norethindrone-ethinyl estradiol (JUNEL 1/20) 1-20 MG-MCG per tablet    Family History:  Family History   Problem Relation Age of Onset    No Known Problems Father     Asthma Other     Mental illness Neg Hx     Substance Abuse Neg Hx        Social History:  Social History     Social History    Marital status: Single     Spouse name: N/A    Number of children: 0    Years of education: N/A     Occupational History    Not on file  Social History Main Topics    Smoking status: Never Smoker    Smokeless tobacco: Never Used    Alcohol use Yes      Comment: occassional 1-2 beers on the weekend     Drug use: No    Sexual activity: Yes     Partners: Male     Birth control/ protection: Pill     Other Topics Concern    Not on file     Social History Narrative    Always uses seat belts    Caffeine use 1 cup coffee daily          Review of Systems:  Gen:   Denies fatigue, chills, nausea, vomiting, fever  Skin: No rashes or discolorations of any concern  RESP: Denies SOB, no cough     CV: Denies chest pain or palpitations  Breasts: Denies masses, pain, skin changes and nipple discharge  GI: Denies abdominal pain, heartburn, nausea, vomiting, changes in bowel habits  : Denies dysuria, frequency, CVA tenderness, incontinence and hematuria  Genitalia: Denies abnormal vaginal discharge, external lesions, rashes, pelvic pain, pressure, abnormal bleeding  Rectal:  Denies pain, bleeding, hemorrhoids,    Physical Exam:  /70 (BP Location: Left arm, Patient Position: Sitting, Cuff Size: Standard)   Ht 5' 6" (1 676 m)   Wt 56 kg (123 lb 6 4 oz)   BMI 19 92 kg/m²    Gen: The patient was alert and oriented x3, pleasant well-appearing female in no acute distress  Neck:  Unremarkable, no anterior or posterior lymphadenopathy, no thyromegaly  Breasts: Symmetric  No dominant, discrete, fixed  or suspicious masses are noted  No skin or nipple changes  No palpable axillary nodes  Abd:  Soft, nontender, nondistended, no masses or organomegaly  Back:  No CVA tenderness, no tenderness to palpation along spine  Pelvic  Normal appearing external female genitalia, no visible lesions, no rashes  Vagina is free of discharge, normal vaginal epithelium, no abnormal  lesions, no evidence of prolapse anteriorly or posteriorly  Normal appearing cervix, mobile and nontender  A thin prep pap smear was not obtained  Uterus is normal size, mobile and, nontender  No palpable adnexal masses or tenderness  No anoperineal lesions  Skin:  No concerning lesions  Extremeties: No edema      Assessment & Plan:   1  Routine annual exam      RTO one year orPRN  2    Contraception, continue Junel 1/20 as prescribed

## 2018-10-22 NOTE — PROGRESS NOTES
Subjective     Well Adult Physical     Paula Delcid is a 24 y o   female and is here for routine health maintenance  The patient reports problems - joint pain and lymphnodes  Patient here for physical     Patient has noted swelling off and on, in joints, arch of foot, joints of fingers and wrists, nodes on back of neck  Come and go  Last a day  Maybe less  Denies rashes, infections  Denies recent travel  Had a strep infection but this was happening before then  No symptoms currently  Diet and Physical Activity  Diet: well balanced diet  Weight concerns: None, patient's BMI is between 18 5-24 9  Exercise: frequently      Depression Screen  PHQ-9 Depression Screening    PHQ-9:    Frequency of the following problems over the past two weeks:               General Health  Hearing: Normal:  bilateral  Vision: no vision problems and most recent eye exam <1 year  Dental: regular dental visits, brushes teeth twice daily and flosses teeth occasionally       LMP: now, regular on OCP    Cancer Screening    Pap done 2016    Smoker never Annual screening with low-dose helical computed tomography (CT) for patients age 54 to 76 years with history of smoking at least 30 pack-years and, if a former smoker, had quit within the previous 15 years      The following portions of the patient's history were reviewed and updated as appropriate: allergies, current medications, past family history, past medical history, past social history, past surgical history and problem list     Review of Systems     Review of Systems   Constitutional: Negative  HENT: Negative  Eyes: Negative  Respiratory: Negative  Cardiovascular: Negative  Gastrointestinal: Negative  Endocrine: Negative  Genitourinary: Negative  Musculoskeletal: Positive for arthralgias  Skin: Negative  Allergic/Immunologic: Negative  Neurological: Negative  Hematological: Positive for adenopathy  Psychiatric/Behavioral: Negative  Past Medical History     History reviewed  No pertinent past medical history  Past Surgical History     Past Surgical History:   Procedure Laterality Date    WISDOM TOOTH EXTRACTION         Social History     Social History     Social History    Marital status: Single     Spouse name: N/A    Number of children: 0    Years of education: N/A     Social History Main Topics    Smoking status: Never Smoker    Smokeless tobacco: Never Used    Alcohol use Yes      Comment: occassional 1-2 beers on the weekend     Drug use: No    Sexual activity: Yes     Partners: Male     Birth control/ protection: Pill     Other Topics Concern    None     Social History Narrative    Always uses seat belts    Caffeine use 1 cup coffee daily        Family History     Family History   Problem Relation Age of Onset    No Known Problems Father     Asthma Other     Mental illness Neg Hx     Substance Abuse Neg Hx        Current Medications       Current Outpatient Prescriptions:     albuterol (PROVENTIL HFA,VENTOLIN HFA) 90 mcg/act inhaler, Inhale 2 puffs every 4 (four) hours, Disp: , Rfl:     norethindrone-ethinyl estradiol (JUNEL 1/20) 1-20 MG-MCG per tablet, Take 1 tablet by mouth daily for 84 days, Disp: 84 tablet, Rfl: 3    rizatriptan (MAXALT-MLT) 10 MG disintegrating tablet, Take 1 tab at migraine onset, and then can repeat once in 24 hours  Max 2 tabs in 24 hours  Max 2 days a week , Disp: 9 tablet, Rfl: 0     Allergies     Allergies   Allergen Reactions    Other Allergic Rhinitis, Sneezing and Wheezing     Cat dander       Objective     /74 (BP Location: Right arm, Patient Position: Sitting, Cuff Size: Standard)   Pulse 80   Resp 17   Ht 5' 5 5" (1 664 m)   Wt 55 2 kg (121 lb 9 6 oz)   BMI 19 93 kg/m²      Physical Exam   Constitutional: She is oriented to person, place, and time  She appears well-developed and well-nourished  HENT:   Head: Normocephalic and atraumatic     Right Ear: External ear normal    Left Ear: External ear normal    Nose: Nose normal    Mouth/Throat: Oropharynx is clear and moist    Eyes: Pupils are equal, round, and reactive to light  Conjunctivae and EOM are normal    Neck: Normal range of motion  Neck supple  No thyromegaly present  Cardiovascular: Normal rate, regular rhythm, normal heart sounds and intact distal pulses  No murmur heard  Pulmonary/Chest: Effort normal and breath sounds normal  No respiratory distress  She has no wheezes  She has no rales  Abdominal: Soft  Bowel sounds are normal  She exhibits no distension and no mass  There is no tenderness  Musculoskeletal: Normal range of motion  She exhibits no edema  Lymphadenopathy:     She has no cervical adenopathy  Neurological: She is alert and oriented to person, place, and time  She has normal reflexes  No cranial nerve deficit  Skin: Skin is warm and dry  Psychiatric: She has a normal mood and affect   Judgment normal          No exam data present    Health Maintenance     Health Maintenance   Topic Date Due    INFLUENZA VACCINE  07/01/2018    Depression Screening PHQ  03/16/2019    DTaP,Tdap,and Td Vaccines (9 - Td) 08/02/2027     Immunization History   Administered Date(s) Administered    DTaP 1994, 1994, 02/10/1995, 02/01/1998, 07/30/1999    H1N1, All Formulations 11/07/2009    HPV Quadrivalent 11/05/2007, 01/07/2008, 05/12/2008    Hep B, Adolescent or Pediatric 08/02/1998, 09/30/1998, 03/11/1999    Hepatitis A 05/24/2011, 08/06/2012    HiB 1994, 1994, 02/10/1995, 11/06/1995    IPV 1994, 1994, 02/01/1996, 07/30/1999    Influenza 10/17/2002, 11/14/2002, 10/19/2005, 11/01/2006, 11/05/2007, 11/12/2008, 09/28/2009, 10/20/2011, 11/29/2013, 10/28/2016, 10/09/2017    Influenza Quadrivalent, 6-35 Months IM 10/28/2016, 10/09/2017    Influenza TIV (IM) 01/01/2015    Influenza, injectable, quadrivalent, preservative free 0 5 mL 10/22/2018    MMR 11/06/1995, 07/30/1999    Meningococcal MCV4P 10/19/2005, 05/24/2011    Tdap 10/19/2005, 04/23/2010, 08/02/2017       Assessment/Plan     Healthy female exam     1  Healthy 24year old female  2  Patient Counseling:   · Nutrition: Stressed importance of a well balanced diet, moderation of sodium/saturated fat, caloric balance and sufficient intake of fiber  · Exercise: Stressed the importance of regular exercise with a goal of 150 minutes per week  · Dental Health: Discussed daily flossing and brushing and regular dental visits   · Sexuality: Discussed sexually transmitted infections, use of condoms and prevention of unintended pregnancy  · Alcohol Use:  Recommended moderation of alcohol intake  · Injury Prevention: Discussed Safety Belts, Safety Helmets, and Smoke Detectors    · Immunizations reviewed  Flu given today  · Discussed benefits of screening PAP  · Discussed the patient's BMI with her  The BMI is in the acceptable range  3  Cancer Screening PAP up to date, Dr Obdulia Marcus  4  Labs ordered per symptoms discussed above, will call with results  5  Follow up in one year      Loli Quezada PA-C

## 2018-10-23 LAB — RHEUMATOID FACT SER QL LA: NEGATIVE

## 2018-10-24 LAB
B BURGDOR IGG SER IA-ACNC: 0.37
B BURGDOR IGM SER IA-ACNC: 0.34
RYE IGE QN: NEGATIVE

## 2018-10-25 ENCOUNTER — TELEPHONE (OUTPATIENT)
Dept: FAMILY MEDICINE CLINIC | Facility: CLINIC | Age: 23
End: 2018-10-25

## 2018-10-25 NOTE — TELEPHONE ENCOUNTER
----- Message from Elton Navas PA-C sent at 10/24/2018  8:44 PM EDT -----  Please let patient know all her labs were normal      Left detailed message to patient

## 2018-12-14 ENCOUNTER — HOSPITAL ENCOUNTER (OUTPATIENT)
Dept: SLEEP CENTER | Facility: CLINIC | Age: 23
Discharge: HOME | End: 2018-12-14
Payer: COMMERCIAL

## 2018-12-14 DIAGNOSIS — G47.10 HYPERSOMNIA: ICD-10-CM

## 2018-12-14 DIAGNOSIS — G47.30 SLEEP-RELATED BREATHING DISORDER: ICD-10-CM

## 2018-12-14 DIAGNOSIS — R51.9 HEADACHE UPON AWAKENING: ICD-10-CM

## 2018-12-14 PROCEDURE — 95810 POLYSOM 6/> YRS 4/> PARAM: CPT

## 2018-12-15 PROBLEM — G47.33 OSA (OBSTRUCTIVE SLEEP APNEA): Status: ACTIVE | Noted: 2018-10-22

## 2018-12-15 NOTE — PROGRESS NOTES
Sleep Study Documentation    Pre-Sleep Study       Sleep testing procedure explained to patient:YES    Patient napped prior to study:NO    Caffeine:Dayshift worker after 12PM   Caffeine use:NO    Alcohol:Dayshift workers after 5PM: Alcohol use:NO    Typical day for patient:YES       Study Documentation  Diagnostic   Snore:None  Supplemental O2: no    O2 flow rate (L/min) range   O2 flow rate (L/min) final   Minimum SaO2 87%  Baseline SaO2 97%        Mode of Therapy:    EKG abnormalities: no     EEG abnormalities: no    Study Terminated:no    Patient classification: student       Post-Sleep Study    Medication used at bedtime or during sleep study:YES over the counter  acctomenophine    Patient reports time it took to fall asleep:30 to 60 minutes    Patient reports waking up during study:1 to 2 times  Patient reports returning to sleep in 10 to 30 minutes  Patient reports sleeping 2 to 4 hours without dreaming  Patient reports sleep during study:worse than usual    Patient rated sleepiness: Somewhat sleepy or tired    PAP treatment:no  24year old female in for diagnostic study  Patient was observed with mild obstructive event

## 2018-12-19 ENCOUNTER — TELEPHONE (OUTPATIENT)
Dept: SLEEP CENTER | Facility: CLINIC | Age: 23
End: 2018-12-19

## 2018-12-19 NOTE — TELEPHONE ENCOUNTER
Attempted to contact pt, LMOM for pt to CB    Sleep study reveals mild ANDRÉS, CPAP study already scheduled 12/28/19 at [facility]

## 2018-12-24 NOTE — TELEPHONE ENCOUNTER
Recalled patient  Left message for patient sleep study resulted   To keep cpap study appt on 12/28/2018

## 2018-12-26 NOTE — TELEPHONE ENCOUNTER
Spoke with patient regarding mild obstructive sleep apnea on study  She prefers to discuss treatment options  Cancelled CPAP study scheduled 12/28 and scheduled follow up for 1/28 in [facility]

## 2019-03-22 ENCOUNTER — APPOINTMENT (EMERGENCY)
Dept: RADIOLOGY | Facility: HOSPITAL | Age: 24
DRG: 510 | End: 2019-03-22
Attending: EMERGENCY MEDICINE
Payer: COMMERCIAL

## 2019-03-22 ENCOUNTER — APPOINTMENT (EMERGENCY)
Dept: RADIOLOGY | Facility: HOSPITAL | Age: 24
DRG: 510 | End: 2019-03-22
Attending: PHYSICIAN ASSISTANT
Payer: COMMERCIAL

## 2019-03-22 ENCOUNTER — ANESTHESIA EVENT (INPATIENT)
Dept: OPERATING ROOM | Facility: HOSPITAL | Age: 24
DRG: 510 | End: 2019-03-22
Payer: COMMERCIAL

## 2019-03-22 ENCOUNTER — HOSPITAL ENCOUNTER (INPATIENT)
Facility: HOSPITAL | Age: 24
LOS: 3 days | Discharge: HOME | DRG: 510 | End: 2019-03-25
Attending: EMERGENCY MEDICINE | Admitting: SURGERY
Payer: COMMERCIAL

## 2019-03-22 DIAGNOSIS — S52.92XA FOREARM FRACTURES, BOTH BONES, CLOSED, LEFT, INITIAL ENCOUNTER: ICD-10-CM

## 2019-03-22 DIAGNOSIS — S06.361A TRAUMATIC HEMORRHAGE OF CEREBRUM WITH LOSS OF CONSCIOUSNESS OF 30 MINUTES OR LESS, UNSPECIFIED LATERALITY, INITIAL ENCOUNTER (CMS/HCC): Primary | ICD-10-CM

## 2019-03-22 DIAGNOSIS — S52.202A FOREARM FRACTURES, BOTH BONES, CLOSED, LEFT, INITIAL ENCOUNTER: ICD-10-CM

## 2019-03-22 PROBLEM — S01.01XA SCALP LACERATION: Status: ACTIVE | Noted: 2019-03-22

## 2019-03-22 LAB
ABO + RH BLD: NORMAL
ABO + RH BLD: NORMAL
ALBUMIN SERPL-MCNC: 3.9 G/DL (ref 3.4–5)
ALP SERPL-CCNC: 40 IU/L (ref 35–126)
ALT SERPL-CCNC: 55 IU/L
AMPHET UR QL SCN: POSITIVE
ANION GAP SERPL CALC-SCNC: 12 MEQ/L (ref 3–15)
APTT PPP: 26 SEC (ref 23–35)
AST SERPL-CCNC: 73 IU/L (ref 15–41)
BACTERIA URNS QL MICRO: ABNORMAL /HPF
BARBITURATES UR QL SCN: ABNORMAL
BASOPHILS # BLD: 0.04 K/UL
BASOPHILS NFR BLD: 0.2 %
BENZODIAZ UR QL SCN: ABNORMAL
BILIRUB SERPL-MCNC: 1 MG/DL (ref 0.3–1.2)
BILIRUB UR QL STRIP.AUTO: NEGATIVE MG/DL
BLD GP AB SCN SERPL QL: NEGATIVE
BUN SERPL-MCNC: 12 MG/DL (ref 8–20)
CALCIUM SERPL-MCNC: 8.8 MG/DL (ref 8.9–10.3)
CANNABINOIDS UR QL SCN: ABNORMAL
CHLORIDE SERPL-SCNC: 106 MEQ/L (ref 98–109)
CLARITY UR REFRACT.AUTO: CLEAR
CO2 SERPL-SCNC: 18 MEQ/L (ref 22–32)
COCAINE UR QL SCN: ABNORMAL
COLOR UR AUTO: YELLOW
CREAT SERPL-MCNC: 0.8 MG/DL
D AG BLD QL: NEGATIVE
D AG BLD QL: NEGATIVE
DIFFERENTIAL METHOD BLD: NORMAL
EOSINOPHIL # BLD: 0.33 K/UL
EOSINOPHIL NFR BLD: 1.9 %
ERYTHROCYTE [DISTWIDTH] IN BLOOD BY AUTOMATED COUNT: 12.7 %
ETHANOL SERPL-MCNC: <5 MG/DL
GFR SERPL CREATININE-BSD FRML MDRD: ABNORMAL ML/MIN/1.73M*2
GLUCOSE SERPL-MCNC: 151 MG/DL (ref 70–99)
GLUCOSE UR STRIP.AUTO-MCNC: NEGATIVE MG/DL
HCT VFR BLDCO AUTO: 41.9 %
HGB BLD-MCNC: 13.9 G/DL
HGB UR QL STRIP.AUTO: 3
HYALINE CASTS #/AREA URNS LPF: ABNORMAL /LPF
IMM GRANULOCYTES # BLD AUTO: 0.14 K/UL
IMM GRANULOCYTES NFR BLD AUTO: 0.8 %
INR PPP: 1 INR
KETONES UR STRIP.AUTO-MCNC: 2 MG/DL
LABORATORY COMMENT REPORT: NORMAL
LABORATORY COMMENT REPORT: NORMAL
LACTATE SERPL-SCNC: 1.4 MMOL/L (ref 0.4–2)
LEUKOCYTE ESTERASE UR QL STRIP.AUTO: NEGATIVE
LYMPHOCYTES # BLD: 4.13 K/UL
LYMPHOCYTES NFR BLD: 24 %
MCH RBC QN AUTO: 29.6 PG
MCHC RBC AUTO-ENTMCNC: 33.2 G/DL
MCV RBC AUTO: 89.1 FL
MONOCYTES # BLD: 0.74 K/UL
MONOCYTES NFR BLD: 4.3 %
MRSA DNA SPEC QL NAA+PROBE: NEGATIVE
NEUTROPHILS # BLD: 11.84 K/UL
NEUTS SEG NFR BLD: 68.8 %
NITRITE UR QL STRIP.AUTO: NEGATIVE
NRBC BLD-RTO: 0 %
OPIATES UR QL SCN: ABNORMAL
PCP UR QL SCN: ABNORMAL
PDW BLD AUTO: 11.8 FL
PH UR STRIP.AUTO: 6 [PH]
PLATELET # BLD AUTO: 294 K/UL
POTASSIUM SERPL-SCNC: 3.5 MEQ/L (ref 3.6–5.1)
PROT SERPL-MCNC: 6.3 G/DL (ref 6–8.2)
PROT UR QL STRIP.AUTO: NEGATIVE
PROTHROMBIN TIME: 12.9 SEC (ref 12.2–14.5)
RBC # BLD AUTO: 4.7 M/UL
RBC #/AREA URNS HPF: ABNORMAL /HPF
SODIUM SERPL-SCNC: 136 MEQ/L (ref 136–144)
SP GR UR REFRACT.AUTO: >1.035
SQUAMOUS URNS QL MICRO: 1 /HPF
UROBILINOGEN UR STRIP-ACNC: 0.2 EU/DL
WBC # BLD AUTO: 17.22 K/UL
WBC #/AREA URNS HPF: ABNORMAL /HPF

## 2019-03-22 PROCEDURE — 73090 X-RAY EXAM OF FOREARM: CPT | Mod: LT

## 2019-03-22 PROCEDURE — 25000000 HC PHARMACY GENERAL: Performed by: PHYSICIAN ASSISTANT

## 2019-03-22 PROCEDURE — 36415 COLL VENOUS BLD VENIPUNCTURE: CPT | Performed by: PHYSICIAN ASSISTANT

## 2019-03-22 PROCEDURE — 74177 CT ABD & PELVIS W/CONTRAST: CPT

## 2019-03-22 PROCEDURE — 99255 IP/OBS CONSLTJ NEW/EST HI 80: CPT | Performed by: NEUROLOGICAL SURGERY

## 2019-03-22 PROCEDURE — 0PSLXZZ REPOSITION LEFT ULNA, EXTERNAL APPROACH: ICD-10-PCS | Performed by: STUDENT IN AN ORGANIZED HEALTH CARE EDUCATION/TRAINING PROGRAM

## 2019-03-22 PROCEDURE — 93005 ELECTROCARDIOGRAM TRACING: CPT | Performed by: PHYSICIAN ASSISTANT

## 2019-03-22 PROCEDURE — 87641 MR-STAPH DNA AMP PROBE: CPT | Performed by: PHYSICIAN ASSISTANT

## 2019-03-22 PROCEDURE — 25800000 HC PHARMACY IV SOLUTIONS: Performed by: EMERGENCY MEDICINE

## 2019-03-22 PROCEDURE — 71260 CT THORAX DX C+: CPT

## 2019-03-22 PROCEDURE — 72125 CT NECK SPINE W/O DYE: CPT

## 2019-03-22 PROCEDURE — 70450 CT HEAD/BRAIN W/O DYE: CPT

## 2019-03-22 PROCEDURE — 63600105 HC IODINE BASED CONTRAST: Performed by: PHYSICIAN ASSISTANT

## 2019-03-22 PROCEDURE — 81001 URINALYSIS AUTO W/SCOPE: CPT | Performed by: PHYSICIAN ASSISTANT

## 2019-03-22 PROCEDURE — 0PSJXZZ REPOSITION LEFT RADIUS, EXTERNAL APPROACH: ICD-10-PCS | Performed by: STUDENT IN AN ORGANIZED HEALTH CARE EDUCATION/TRAINING PROGRAM

## 2019-03-22 PROCEDURE — 96365 THER/PROPH/DIAG IV INF INIT: CPT | Mod: 59

## 2019-03-22 PROCEDURE — 99285 EMERGENCY DEPT VISIT HI MDM: CPT | Mod: 25

## 2019-03-22 PROCEDURE — 72170 X-RAY EXAM OF PELVIS: CPT

## 2019-03-22 PROCEDURE — 63600000 HC DRUGS/DETAIL CODE: Performed by: PHYSICIAN ASSISTANT

## 2019-03-22 PROCEDURE — 63600000 HC DRUGS/DETAIL CODE: Performed by: EMERGENCY MEDICINE

## 2019-03-22 PROCEDURE — 86920 COMPATIBILITY TEST SPIN: CPT

## 2019-03-22 PROCEDURE — G0480 DRUG TEST DEF 1-7 CLASSES: HCPCS | Performed by: PHYSICIAN ASSISTANT

## 2019-03-22 PROCEDURE — 85730 THROMBOPLASTIN TIME PARTIAL: CPT | Performed by: PHYSICIAN ASSISTANT

## 2019-03-22 PROCEDURE — 73100 X-RAY EXAM OF WRIST: CPT | Mod: LT

## 2019-03-22 PROCEDURE — 83605 ASSAY OF LACTIC ACID: CPT | Performed by: PHYSICIAN ASSISTANT

## 2019-03-22 PROCEDURE — 25800000 HC PHARMACY IV SOLUTIONS: Performed by: PHYSICIAN ASSISTANT

## 2019-03-22 PROCEDURE — 85610 PROTHROMBIN TIME: CPT | Performed by: PHYSICIAN ASSISTANT

## 2019-03-22 PROCEDURE — 80307 DRUG TEST PRSMV CHEM ANLYZR: CPT | Performed by: PHYSICIAN ASSISTANT

## 2019-03-22 PROCEDURE — 0HQ0XZZ REPAIR SCALP SKIN, EXTERNAL APPROACH: ICD-10-PCS | Performed by: SURGERY

## 2019-03-22 PROCEDURE — 71045 X-RAY EXAM CHEST 1 VIEW: CPT

## 2019-03-22 PROCEDURE — 80053 COMPREHEN METABOLIC PANEL: CPT | Performed by: PHYSICIAN ASSISTANT

## 2019-03-22 PROCEDURE — 85025 COMPLETE CBC W/AUTO DIFF WBC: CPT | Performed by: PHYSICIAN ASSISTANT

## 2019-03-22 PROCEDURE — 73070 X-RAY EXAM OF ELBOW: CPT | Mod: LT

## 2019-03-22 PROCEDURE — 86850 RBC ANTIBODY SCREEN: CPT

## 2019-03-22 PROCEDURE — 63700000 HC SELF-ADMINISTRABLE DRUG: Performed by: PHYSICIAN ASSISTANT

## 2019-03-22 PROCEDURE — 20000000 HC ROOM AND CARE ICU

## 2019-03-22 RX ORDER — ALBUTEROL SULFATE 90 UG/1
2 INHALANT RESPIRATORY (INHALATION) EVERY 6 HOURS PRN
COMMUNITY
End: 2019-03-29 | Stop reason: ALTCHOICE

## 2019-03-22 RX ORDER — OXYCODONE HYDROCHLORIDE 5 MG/1
5 TABLET ORAL EVERY 4 HOURS PRN
Status: DISCONTINUED | OUTPATIENT
Start: 2019-03-22 | End: 2019-03-24

## 2019-03-22 RX ORDER — OXYCODONE HYDROCHLORIDE 5 MG/1
10 TABLET ORAL EVERY 4 HOURS PRN
Status: DISCONTINUED | OUTPATIENT
Start: 2019-03-22 | End: 2019-03-24

## 2019-03-22 RX ORDER — DEXTROSE 40 %
15-30 GEL (GRAM) ORAL AS NEEDED
Status: DISCONTINUED | OUTPATIENT
Start: 2019-03-22 | End: 2019-03-23

## 2019-03-22 RX ORDER — PROPOFOL 200MG/20ML
SYRINGE (ML) INTRAVENOUS
Status: COMPLETED | OUTPATIENT
Start: 2019-03-22 | End: 2019-03-22

## 2019-03-22 RX ORDER — ACETAMINOPHEN 325 MG/1
650 TABLET ORAL EVERY 6 HOURS
Status: DISCONTINUED | OUTPATIENT
Start: 2019-03-22 | End: 2019-03-25 | Stop reason: HOSPADM

## 2019-03-22 RX ORDER — IBUPROFEN 200 MG
16-32 TABLET ORAL AS NEEDED
Status: DISCONTINUED | OUTPATIENT
Start: 2019-03-22 | End: 2019-03-23

## 2019-03-22 RX ORDER — ONDANSETRON 4 MG/1
4 TABLET, ORALLY DISINTEGRATING ORAL EVERY 8 HOURS PRN
Status: DISCONTINUED | OUTPATIENT
Start: 2019-03-22 | End: 2019-03-25 | Stop reason: HOSPADM

## 2019-03-22 RX ORDER — FENTANYL CITRATE 50 UG/ML
25 INJECTION, SOLUTION INTRAMUSCULAR; INTRAVENOUS ONCE
Status: COMPLETED | OUTPATIENT
Start: 2019-03-22 | End: 2019-03-22

## 2019-03-22 RX ORDER — DEXTROSE 50 % IN WATER (D50W) INTRAVENOUS SYRINGE
25 AS NEEDED
Status: DISCONTINUED | OUTPATIENT
Start: 2019-03-22 | End: 2019-03-25 | Stop reason: HOSPADM

## 2019-03-22 RX ORDER — CEFAZOLIN SODIUM 2 G/50ML
2 SOLUTION INTRAVENOUS ONCE
Status: DISCONTINUED | OUTPATIENT
Start: 2019-03-22 | End: 2019-03-22

## 2019-03-22 RX ORDER — CEFAZOLIN SODIUM 1 G/50ML
1 SOLUTION INTRAVENOUS ONCE
Status: COMPLETED | OUTPATIENT
Start: 2019-03-22 | End: 2019-03-22

## 2019-03-22 RX ORDER — BACITRACIN 500 UNIT/G
1 OINTMENT (GRAM) TOPICAL 2 TIMES DAILY
Status: DISCONTINUED | OUTPATIENT
Start: 2019-03-22 | End: 2019-03-25 | Stop reason: HOSPADM

## 2019-03-22 RX ORDER — CEFAZOLIN SODIUM 2 G/50ML
2 SOLUTION INTRAVENOUS ONCE
Status: DISCONTINUED | OUTPATIENT
Start: 2019-03-23 | End: 2019-03-23

## 2019-03-22 RX ORDER — LIDOCAINE HYDROCHLORIDE AND EPINEPHRINE 10; 10 UG/ML; MG/ML
20 INJECTION, SOLUTION INFILTRATION; PERINEURAL ONCE
Status: COMPLETED | OUTPATIENT
Start: 2019-03-22 | End: 2019-03-22

## 2019-03-22 RX ORDER — POTASSIUM CHLORIDE 750 MG/1
20 TABLET, FILM COATED, EXTENDED RELEASE ORAL ONCE
Status: COMPLETED | OUTPATIENT
Start: 2019-03-22 | End: 2019-03-22

## 2019-03-22 RX ORDER — ALBUTEROL SULFATE 0.83 MG/ML
5 SOLUTION RESPIRATORY (INHALATION) EVERY 6 HOURS PRN
Status: DISCONTINUED | OUTPATIENT
Start: 2019-03-22 | End: 2019-03-25 | Stop reason: HOSPADM

## 2019-03-22 RX ORDER — POTASSIUM CHLORIDE 14.9 MG/ML
20 INJECTION INTRAVENOUS ONCE
Status: DISCONTINUED | OUTPATIENT
Start: 2019-03-22 | End: 2019-03-22

## 2019-03-22 RX ORDER — ONDANSETRON HYDROCHLORIDE 2 MG/ML
4 INJECTION, SOLUTION INTRAVENOUS EVERY 8 HOURS PRN
Status: DISCONTINUED | OUTPATIENT
Start: 2019-03-22 | End: 2019-03-25 | Stop reason: HOSPADM

## 2019-03-22 RX ORDER — SODIUM CHLORIDE 9 MG/ML
INJECTION, SOLUTION INTRAVENOUS CONTINUOUS
Status: ACTIVE | OUTPATIENT
Start: 2019-03-22 | End: 2019-03-24

## 2019-03-22 RX ORDER — AMOXICILLIN 250 MG
1 CAPSULE ORAL 2 TIMES DAILY
Status: DISCONTINUED | OUTPATIENT
Start: 2019-03-22 | End: 2019-03-25 | Stop reason: HOSPADM

## 2019-03-22 RX ADMIN — POTASSIUM CHLORIDE 20 MEQ: 14.9 INJECTION, SOLUTION INTRAVENOUS at 14:37

## 2019-03-22 RX ADMIN — ONDANSETRON 4 MG: 2 INJECTION INTRAMUSCULAR; INTRAVENOUS at 19:42

## 2019-03-22 RX ADMIN — FENTANYL CITRATE 25 MCG: 50 INJECTION, SOLUTION INTRAMUSCULAR; INTRAVENOUS at 13:38

## 2019-03-22 RX ADMIN — PROPOFOL 40 MG: 10 INJECTION, EMULSION INTRAVENOUS at 11:45

## 2019-03-22 RX ADMIN — ACETAMINOPHEN 650 MG: 325 TABLET ORAL at 14:36

## 2019-03-22 RX ADMIN — LIDOCAINE HYDROCHLORIDE AND EPINEPHRINE 20 ML: 10; 10 INJECTION, SOLUTION INFILTRATION; PERINEURAL at 12:15

## 2019-03-22 RX ADMIN — POTASSIUM CHLORIDE 20 MEQ: 750 TABLET, FILM COATED, EXTENDED RELEASE ORAL at 15:52

## 2019-03-22 RX ADMIN — PROPOFOL 40 MG: 10 INJECTION, EMULSION INTRAVENOUS at 11:41

## 2019-03-22 RX ADMIN — SODIUM CHLORIDE 1000 ML: 900 INJECTION, SOLUTION INTRAVENOUS at 11:11

## 2019-03-22 RX ADMIN — SODIUM CHLORIDE: 9 INJECTION, SOLUTION INTRAVENOUS at 14:25

## 2019-03-22 RX ADMIN — BACITRACIN 1 APPLICATION.: 500 OINTMENT TOPICAL at 20:50

## 2019-03-22 RX ADMIN — CEFAZOLIN 1 G: 1 INJECTION, POWDER, FOR SOLUTION INTRAVENOUS at 12:15

## 2019-03-22 RX ADMIN — SODIUM CHLORIDE 1000 ML: 900 INJECTION, SOLUTION INTRAVENOUS at 10:40

## 2019-03-22 RX ADMIN — PROPOFOL 60 MG: 10 INJECTION, EMULSION INTRAVENOUS at 11:37

## 2019-03-22 RX ADMIN — ACETAMINOPHEN 650 MG: 325 TABLET ORAL at 23:41

## 2019-03-22 RX ADMIN — IOHEXOL 100 ML: 350 INJECTION, SOLUTION INTRAVENOUS at 10:58

## 2019-03-22 RX ADMIN — PROPOFOL 40 MG: 10 INJECTION, EMULSION INTRAVENOUS at 11:39

## 2019-03-22 ASSESSMENT — COGNITIVE AND FUNCTIONAL STATUS - GENERAL
TOILETING: 4 - NONE
CLIMB 3 TO 5 STEPS WITH RAILING: 4 - NONE
HELP NEEDED FOR PERSONAL GROOMING: 4 - NONE
DRESSING REGULAR LOWER BODY CLOTHING: 4 - NONE
STANDING UP FROM CHAIR USING ARMS: 4 - NONE
EATING MEALS: 4 - NONE
MOVING TO AND FROM BED TO CHAIR: 4 - NONE
DRESSING REGULAR UPPER BODY CLOTHING: 4 - NONE
HELP NEEDED FOR BATHING: 4 - NONE
WALKING IN HOSPITAL ROOM: 4 - NONE

## 2019-03-22 ASSESSMENT — ENCOUNTER SYMPTOMS: LOSS OF CONSCIOUSNESS: 1
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
## 2019-03-22 NOTE — PROCEDURES
Scalp laceration repair  Patient presents with approximately 3cm full skin thickness right parietal scalp laceration in curvilinear shape with stellate appearance on medial end. All steps of procedure explained to patient and verbal consent obtained. Laceration with irrigated and cleansed with betadine and normal saline. Area was anesthestized with intradermal infiltration of 1% lidocaine with epinephrine. Laceration was closed with 9 simple interrupted sutures using 4-0 prolene. Hemostasis achieved. Patient tolerated procedure without issue. Sutures to be removed in 7-10 days. Bacitracin to laceration site BID.
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
## 2019-03-22 NOTE — PROGRESS NOTES
Paged at 10:26- Arrived at 10:30. Present during pt assessment. No needs at this time. Will follow up after admitted or when family arrives. Please page spiritual care if needs arise. - Rev. Amado Reinoso, Spiritual Care Coordinator   [phone]
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
## 2019-03-23 ENCOUNTER — ANESTHESIA (INPATIENT)
Dept: OPERATING ROOM | Facility: HOSPITAL | Age: 24
DRG: 510 | End: 2019-03-23
Payer: COMMERCIAL

## 2019-03-23 ENCOUNTER — APPOINTMENT (INPATIENT)
Dept: RADIOLOGY | Facility: HOSPITAL | Age: 24
DRG: 510 | End: 2019-03-23
Attending: PHYSICIAN ASSISTANT
Payer: COMMERCIAL

## 2019-03-23 ENCOUNTER — APPOINTMENT (INPATIENT)
Dept: RADIOLOGY | Facility: HOSPITAL | Age: 24
DRG: 510 | End: 2019-03-23
Attending: SPECIALIST
Payer: COMMERCIAL

## 2019-03-23 LAB
ANION GAP SERPL CALC-SCNC: 11 MEQ/L (ref 3–15)
BUN SERPL-MCNC: 6 MG/DL (ref 8–20)
CALCIUM SERPL-MCNC: 8.6 MG/DL (ref 8.9–10.3)
CHLORIDE SERPL-SCNC: 111 MEQ/L (ref 98–109)
CO2 SERPL-SCNC: 17 MEQ/L (ref 22–32)
CREAT SERPL-MCNC: 0.6 MG/DL
ERYTHROCYTE [DISTWIDTH] IN BLOOD BY AUTOMATED COUNT: 12.8 %
GFR SERPL CREATININE-BSD FRML MDRD: ABNORMAL ML/MIN/1.73M*2
GLUCOSE SERPL-MCNC: 106 MG/DL (ref 70–99)
HCG UR QL: NEGATIVE
HCT VFR BLDCO AUTO: 33 %
HGB BLD-MCNC: 11.2 G/DL
MAGNESIUM SERPL-MCNC: 1.9 MG/DL (ref 1.8–2.5)
MCH RBC QN AUTO: 29.6 PG
MCHC RBC AUTO-ENTMCNC: 33.9 G/DL
MCV RBC AUTO: 87.1 FL
PDW BLD AUTO: 11.9 FL
PHOSPHATE SERPL-MCNC: 3.9 MG/DL (ref 2.4–4.7)
PLATELET # BLD AUTO: 212 K/UL
POTASSIUM SERPL-SCNC: 4 MEQ/L (ref 3.6–5.1)
RBC # BLD AUTO: 3.79 M/UL
SODIUM SERPL-SCNC: 139 MEQ/L (ref 136–144)
WBC # BLD AUTO: 11.83 K/UL

## 2019-03-23 PROCEDURE — 36000013 HC OR LEVEL 3 EA ADDL MIN: Performed by: ORTHOPAEDIC SURGERY

## 2019-03-23 PROCEDURE — 80048 BASIC METABOLIC PNL TOTAL CA: CPT | Performed by: PHYSICIAN ASSISTANT

## 2019-03-23 PROCEDURE — 63600000 HC DRUGS/DETAIL CODE: Performed by: NURSE ANESTHETIST, CERTIFIED REGISTERED

## 2019-03-23 PROCEDURE — 25800000 HC PHARMACY IV SOLUTIONS: Performed by: NURSE PRACTITIONER

## 2019-03-23 PROCEDURE — 36415 COLL VENOUS BLD VENIPUNCTURE: CPT | Performed by: PHYSICIAN ASSISTANT

## 2019-03-23 PROCEDURE — 25000000 HC PHARMACY GENERAL: Performed by: ORTHOPAEDIC SURGERY

## 2019-03-23 PROCEDURE — 25000000 HC PHARMACY GENERAL: Performed by: NURSE ANESTHETIST, CERTIFIED REGISTERED

## 2019-03-23 PROCEDURE — 0PSJ04Z REPOSITION LEFT RADIUS WITH INTERNAL FIXATION DEVICE, OPEN APPROACH: ICD-10-PCS | Performed by: ORTHOPAEDIC SURGERY

## 2019-03-23 PROCEDURE — 73090 X-RAY EXAM OF FOREARM: CPT | Mod: LT

## 2019-03-23 PROCEDURE — 84703 CHORIONIC GONADOTROPIN ASSAY: CPT | Performed by: PHYSICIAN ASSISTANT

## 2019-03-23 PROCEDURE — C1713 ANCHOR/SCREW BN/BN,TIS/BN: HCPCS | Performed by: ORTHOPAEDIC SURGERY

## 2019-03-23 PROCEDURE — 27200000 HC STERILE SUPPLY: Performed by: ORTHOPAEDIC SURGERY

## 2019-03-23 PROCEDURE — 70450 CT HEAD/BRAIN W/O DYE: CPT

## 2019-03-23 PROCEDURE — 37000001 HC ANESTHESIA GENERAL: Performed by: ORTHOPAEDIC SURGERY

## 2019-03-23 PROCEDURE — 21400000 HC ROOM AND CARE CCU/INTERMEDIATE

## 2019-03-23 PROCEDURE — 0PSL04Z REPOSITION LEFT ULNA WITH INTERNAL FIXATION DEVICE, OPEN APPROACH: ICD-10-PCS | Performed by: ORTHOPAEDIC SURGERY

## 2019-03-23 PROCEDURE — 36000003 HC OR LEVEL 3 INITIAL 30MIN: Performed by: ORTHOPAEDIC SURGERY

## 2019-03-23 PROCEDURE — 85027 COMPLETE CBC AUTOMATED: CPT | Performed by: PHYSICIAN ASSISTANT

## 2019-03-23 PROCEDURE — 25800000 HC PHARMACY IV SOLUTIONS: Performed by: PHYSICIAN ASSISTANT

## 2019-03-23 PROCEDURE — 63700000 HC SELF-ADMINISTRABLE DRUG: Performed by: PHYSICIAN ASSISTANT

## 2019-03-23 PROCEDURE — 84100 ASSAY OF PHOSPHORUS: CPT | Performed by: PHYSICIAN ASSISTANT

## 2019-03-23 PROCEDURE — 63700000 HC SELF-ADMINISTRABLE DRUG: Performed by: ANESTHESIOLOGY

## 2019-03-23 PROCEDURE — 71000001 HC PACU PHASE 1 INITIAL 30MIN: Performed by: ORTHOPAEDIC SURGERY

## 2019-03-23 PROCEDURE — 83735 ASSAY OF MAGNESIUM: CPT | Performed by: PHYSICIAN ASSISTANT

## 2019-03-23 PROCEDURE — 71000011 HC PACU PHASE 1 EA ADDL MIN: Performed by: ORTHOPAEDIC SURGERY

## 2019-03-23 PROCEDURE — 63600000 HC DRUGS/DETAIL CODE: Performed by: PHYSICIAN ASSISTANT

## 2019-03-23 DEVICE — SCREW 2.7MM CORTEX SLF-TPNG 12MM W/T8 STARDRIVE RECESS: Type: IMPLANTABLE DEVICE | Status: FUNCTIONAL

## 2019-03-23 DEVICE — IMPLANTABLE DEVICE: Type: IMPLANTABLE DEVICE | Status: FUNCTIONAL

## 2019-03-23 DEVICE — SCREW 2.7MM CORTEX SLF-TPNG 18MM W/T8 STARDRIVE RECESS: Type: IMPLANTABLE DEVICE | Status: FUNCTIONAL

## 2019-03-23 DEVICE — SCREW 2.7MM CORTEX SLF-TPNG 16MM W/T8 STARDRIVE RECESS: Type: IMPLANTABLE DEVICE | Status: FUNCTIONAL

## 2019-03-23 DEVICE — SCREW 2.7MM CORTEX SLF-TPNG 14MM W/T8 STARDRIVE RECESS: Type: IMPLANTABLE DEVICE | Status: FUNCTIONAL

## 2019-03-23 RX ORDER — LIDOCAINE HYDROCHLORIDE 10 MG/ML
INJECTION, SOLUTION INFILTRATION; PERINEURAL AS NEEDED
Status: DISCONTINUED | OUTPATIENT
Start: 2019-03-23 | End: 2019-03-23 | Stop reason: SURG

## 2019-03-23 RX ORDER — CEFAZOLIN SODIUM 1 G/50ML
SOLUTION INTRAVENOUS AS NEEDED
Status: DISCONTINUED | OUTPATIENT
Start: 2019-03-23 | End: 2019-03-23 | Stop reason: SURG

## 2019-03-23 RX ORDER — FENTANYL CITRATE 50 UG/ML
INJECTION, SOLUTION INTRAMUSCULAR; INTRAVENOUS AS NEEDED
Status: DISCONTINUED | OUTPATIENT
Start: 2019-03-23 | End: 2019-03-23 | Stop reason: SURG

## 2019-03-23 RX ORDER — DEXTROSE 50 % IN WATER (D50W) INTRAVENOUS SYRINGE
25 AS NEEDED
Status: DISCONTINUED | OUTPATIENT
Start: 2019-03-23 | End: 2019-03-23 | Stop reason: HOSPADM

## 2019-03-23 RX ORDER — MIDAZOLAM HYDROCHLORIDE 2 MG/2ML
INJECTION, SOLUTION INTRAMUSCULAR; INTRAVENOUS AS NEEDED
Status: DISCONTINUED | OUTPATIENT
Start: 2019-03-23 | End: 2019-03-23 | Stop reason: SURG

## 2019-03-23 RX ORDER — BUPIVACAINE HYDROCHLORIDE 5 MG/ML
INJECTION, SOLUTION EPIDURAL; INTRACAUDAL AS NEEDED
Status: DISCONTINUED | OUTPATIENT
Start: 2019-03-23 | End: 2019-03-23 | Stop reason: HOSPADM

## 2019-03-23 RX ORDER — GLYCOPYRROLATE 0.6MG/3ML
SYRINGE (ML) INTRAVENOUS AS NEEDED
Status: DISCONTINUED | OUTPATIENT
Start: 2019-03-23 | End: 2019-03-23 | Stop reason: SURG

## 2019-03-23 RX ORDER — HYDROMORPHONE HYDROCHLORIDE 1 MG/ML
INJECTION, SOLUTION INTRAMUSCULAR; INTRAVENOUS; SUBCUTANEOUS AS NEEDED
Status: DISCONTINUED | OUTPATIENT
Start: 2019-03-23 | End: 2019-03-23 | Stop reason: SURG

## 2019-03-23 RX ORDER — ROCURONIUM BROMIDE 10 MG/ML
INJECTION, SOLUTION INTRAVENOUS AS NEEDED
Status: DISCONTINUED | OUTPATIENT
Start: 2019-03-23 | End: 2019-03-23 | Stop reason: SURG

## 2019-03-23 RX ORDER — DEXAMETHASONE SODIUM PHOSPHATE 4 MG/ML
INJECTION, SOLUTION INTRA-ARTICULAR; INTRALESIONAL; INTRAMUSCULAR; INTRAVENOUS; SOFT TISSUE AS NEEDED
Status: DISCONTINUED | OUTPATIENT
Start: 2019-03-23 | End: 2019-03-23 | Stop reason: SURG

## 2019-03-23 RX ORDER — DEXTROSE 40 %
15-30 GEL (GRAM) ORAL AS NEEDED
Status: DISCONTINUED | OUTPATIENT
Start: 2019-03-23 | End: 2019-03-23 | Stop reason: HOSPADM

## 2019-03-23 RX ORDER — NEOSTIGMINE METHYLSULFATE 1 MG/ML
INJECTION INTRAVENOUS AS NEEDED
Status: DISCONTINUED | OUTPATIENT
Start: 2019-03-23 | End: 2019-03-23 | Stop reason: SURG

## 2019-03-23 RX ORDER — IBUPROFEN 200 MG
16-32 TABLET ORAL AS NEEDED
Status: DISCONTINUED | OUTPATIENT
Start: 2019-03-23 | End: 2019-03-23 | Stop reason: HOSPADM

## 2019-03-23 RX ORDER — EPHEDRINE SULFATE 50 MG/ML
INJECTION, SOLUTION INTRAVENOUS AS NEEDED
Status: DISCONTINUED | OUTPATIENT
Start: 2019-03-23 | End: 2019-03-23 | Stop reason: SURG

## 2019-03-23 RX ORDER — DIPHENHYDRAMINE HCL 50 MG/ML
12.5 VIAL (ML) INJECTION
Status: DISCONTINUED | OUTPATIENT
Start: 2019-03-23 | End: 2019-03-23 | Stop reason: HOSPADM

## 2019-03-23 RX ORDER — ACETAMINOPHEN 325 MG/1
650 TABLET ORAL ONCE
Status: COMPLETED | OUTPATIENT
Start: 2019-03-23 | End: 2019-03-23

## 2019-03-23 RX ORDER — CEFAZOLIN SODIUM 2 G/50ML
2 SOLUTION INTRAVENOUS
Status: DISCONTINUED | OUTPATIENT
Start: 2019-03-23 | End: 2019-03-25

## 2019-03-23 RX ORDER — PHENYLEPHRINE HYDROCHLORIDE 10 MG/ML
INJECTION INTRAVENOUS AS NEEDED
Status: DISCONTINUED | OUTPATIENT
Start: 2019-03-23 | End: 2019-03-23 | Stop reason: SURG

## 2019-03-23 RX ORDER — ONDANSETRON HYDROCHLORIDE 2 MG/ML
4 INJECTION, SOLUTION INTRAVENOUS
Status: DISCONTINUED | OUTPATIENT
Start: 2019-03-23 | End: 2019-03-23 | Stop reason: HOSPADM

## 2019-03-23 RX ORDER — PROPOFOL 10 MG/ML
INJECTION, EMULSION INTRAVENOUS AS NEEDED
Status: DISCONTINUED | OUTPATIENT
Start: 2019-03-23 | End: 2019-03-23 | Stop reason: SURG

## 2019-03-23 RX ADMIN — SODIUM CHLORIDE: 9 INJECTION, SOLUTION INTRAVENOUS at 00:25

## 2019-03-23 RX ADMIN — FENTANYL CITRATE 25 MCG: 50 INJECTION, SOLUTION INTRAMUSCULAR; INTRAVENOUS at 15:19

## 2019-03-23 RX ADMIN — PROPOFOL 200 MG: 10 INJECTION, EMULSION INTRAVENOUS at 09:38

## 2019-03-23 RX ADMIN — DEXAMETHASONE SODIUM PHOSPHATE 4 MG: 4 INJECTION, SOLUTION INTRAMUSCULAR; INTRAVENOUS at 10:14

## 2019-03-23 RX ADMIN — SENNOSIDES AND DOCUSATE SODIUM 1 TABLET: 8.6; 5 TABLET ORAL at 23:08

## 2019-03-23 RX ADMIN — PHENYLEPHRINE HYDROCHLORIDE 50 MCG: 10 INJECTION INTRAVENOUS at 12:49

## 2019-03-23 RX ADMIN — DEXAMETHASONE SODIUM PHOSPHATE 4 MG: 4 INJECTION, SOLUTION INTRAMUSCULAR; INTRAVENOUS at 14:19

## 2019-03-23 RX ADMIN — EPHEDRINE SULFATE 5 MG: 50 INJECTION INTRAVENOUS at 12:53

## 2019-03-23 RX ADMIN — ROCURONIUM BROMIDE 50 MG: 10 INJECTION, SOLUTION INTRAVENOUS at 09:38

## 2019-03-23 RX ADMIN — CEFAZOLIN SODIUM 2 G: 1 SOLUTION INTRAVENOUS at 09:35

## 2019-03-23 RX ADMIN — SODIUM CHLORIDE: 9 INJECTION, SOLUTION INTRAVENOUS at 09:32

## 2019-03-23 RX ADMIN — FENTANYL CITRATE 100 MCG: 50 INJECTION, SOLUTION INTRAMUSCULAR; INTRAVENOUS at 09:38

## 2019-03-23 RX ADMIN — ONDANSETRON 4 MG: 2 INJECTION INTRAMUSCULAR; INTRAVENOUS at 10:14

## 2019-03-23 RX ADMIN — FENTANYL CITRATE 25 MCG: 50 INJECTION, SOLUTION INTRAMUSCULAR; INTRAVENOUS at 11:33

## 2019-03-23 RX ADMIN — Medication 0.5 MG: at 11:30

## 2019-03-23 RX ADMIN — ACETAMINOPHEN 650 MG: 325 TABLET ORAL at 05:04

## 2019-03-23 RX ADMIN — GLYCOPYRROLATE 0.4 MG: 0.2 INJECTION INTRAMUSCULAR; INTRAVENOUS at 14:40

## 2019-03-23 RX ADMIN — ACETAMINOPHEN 650 MG: 325 TABLET ORAL at 23:08

## 2019-03-23 RX ADMIN — CEFAZOLIN SODIUM 1 G: 1 SOLUTION INTRAVENOUS at 13:16

## 2019-03-23 RX ADMIN — ONDANSETRON 4 MG: 2 INJECTION INTRAMUSCULAR; INTRAVENOUS at 14:30

## 2019-03-23 RX ADMIN — ACETAMINOPHEN 650 MG: 325 TABLET ORAL at 17:25

## 2019-03-23 RX ADMIN — Medication 0.5 MG: at 11:59

## 2019-03-23 RX ADMIN — MIDAZOLAM HYDROCHLORIDE 2 MG: 1 INJECTION, SOLUTION INTRAMUSCULAR; INTRAVENOUS at 09:33

## 2019-03-23 RX ADMIN — LIDOCAINE HYDROCHLORIDE 5 ML: 10 INJECTION, SOLUTION INFILTRATION; PERINEURAL at 09:38

## 2019-03-23 RX ADMIN — Medication 0.5 MG: at 15:17

## 2019-03-23 RX ADMIN — Medication 2.5 MG: at 14:40

## 2019-03-23 RX ADMIN — FENTANYL CITRATE 25 MCG: 50 INJECTION, SOLUTION INTRAMUSCULAR; INTRAVENOUS at 10:33

## 2019-03-23 RX ADMIN — FENTANYL CITRATE 50 MCG: 50 INJECTION, SOLUTION INTRAMUSCULAR; INTRAVENOUS at 10:59

## 2019-03-23 RX ADMIN — BACITRACIN 1 APPLICATION.: 500 OINTMENT TOPICAL at 22:57

## 2019-03-23 NOTE — ANESTHESIA PROCEDURE NOTES
Airway  Urgency: elective    Start Time: 3/23/2019 9:42 AM  Airway not difficult    General Information and Staff    Patient location during procedure: OR  Anesthesiologist: HECTOR WEISS  Resident/CRNA: JUSTIN EUGENE  Performed: resident/CRNA     Indications and Patient Condition  Indications for airway management: anesthesia  Sedation level: deep  Preoxygenated: yes  Patient position: sniffing  Mask difficulty assessment: 1 - vent by mask    Final Airway Details  Final airway type: endotracheal airway      Successful airway: ETT    Successful intubation technique: direct laryngoscopy  Facilitating devices/methods: intubating stylet  Endotracheal tube insertion site: oral  Blade: Birgit  Blade size: #3  ETT size: 7.0 mm  Cormack-Lehane Classification: grade I - full view of glottis  Placement verified by: chest auscultation and capnometry   Measured from: lips  ETT to lips (cm): 22  Number of attempts at approach: 1

## 2019-03-23 NOTE — PROGRESS NOTES
Patient: Nichole Holbrook  Location: [facility] SICU11  MRN: [id number]  Today's date: 3/23/2019    Attempted to see patient for therapy. Unable due to patient at test or procedure (OR Today).
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
## 2019-03-23 NOTE — PLAN OF CARE
Problem: Patient Care Overview  Goal: Plan of Care Review  Outcome: Ongoing (interventions implemented as appropriate)   03/22/19 0126   Coping/Psychosocial   Plan Of Care Reviewed With patient;family   Plan of Care Review   Progress improving       Problem: Fall Risk (Adult)  Goal: Identify Related Risk Factors and Signs and Symptoms  Outcome: Ongoing (interventions implemented as appropriate)    Goal: Absence of Falls  Outcome: Ongoing (interventions implemented as appropriate)      Problem: Wound, Traumatic, Nonburn (Adult)  Goal: Signs and Symptoms of Listed Potential Problems Will be Absent, Minimized or Managed (Wound, Traumatic, Nonburn)  Outcome: Ongoing (interventions implemented as appropriate)      Problem: Fracture Orthopaedic (Adult)  Goal: Signs and Symptoms of Listed Potential Problems Will be Absent, Minimized or Managed (Fracture Orthopaedic)  Outcome: Ongoing (interventions implemented as appropriate)
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
## 2019-03-23 NOTE — OR SURGEON
Pre-Procedure patient identification:  I am the primary operating surgeon/proceduralist and I have identified the patient on 03/23/19 at 8:50 AM Angel Raines DO  Phone Number: [phone]
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
## 2019-03-24 LAB
ANION GAP SERPL CALC-SCNC: 10 MEQ/L (ref 3–15)
BUN SERPL-MCNC: 6 MG/DL (ref 8–20)
CALCIUM SERPL-MCNC: 8.6 MG/DL (ref 8.9–10.3)
CHLORIDE SERPL-SCNC: 109 MEQ/L (ref 98–109)
CO2 SERPL-SCNC: 20 MEQ/L (ref 22–32)
CREAT SERPL-MCNC: 0.6 MG/DL
ERYTHROCYTE [DISTWIDTH] IN BLOOD BY AUTOMATED COUNT: 13.1 %
GFR SERPL CREATININE-BSD FRML MDRD: ABNORMAL ML/MIN/1.73M*2
GLUCOSE SERPL-MCNC: 116 MG/DL (ref 70–99)
HCT VFR BLDCO AUTO: 32.9 %
HGB BLD-MCNC: 10.8 G/DL
MAGNESIUM SERPL-MCNC: 2.2 MG/DL (ref 1.8–2.5)
MCH RBC QN AUTO: 29.6 PG
MCHC RBC AUTO-ENTMCNC: 32.8 G/DL
MCV RBC AUTO: 90.1 FL
PDW BLD AUTO: 12.3 FL
PHOSPHATE SERPL-MCNC: 2.3 MG/DL (ref 2.4–4.7)
PLATELET # BLD AUTO: 196 K/UL
POTASSIUM SERPL-SCNC: 3.8 MEQ/L (ref 3.6–5.1)
RBC # BLD AUTO: 3.65 M/UL
SODIUM SERPL-SCNC: 139 MEQ/L (ref 136–144)
WBC # BLD AUTO: 11.1 K/UL

## 2019-03-24 PROCEDURE — 21400000 HC ROOM AND CARE CCU/INTERMEDIATE

## 2019-03-24 PROCEDURE — 83735 ASSAY OF MAGNESIUM: CPT | Performed by: PHYSICIAN ASSISTANT

## 2019-03-24 PROCEDURE — 80048 BASIC METABOLIC PNL TOTAL CA: CPT | Performed by: PHYSICIAN ASSISTANT

## 2019-03-24 PROCEDURE — 63700000 HC SELF-ADMINISTRABLE DRUG: Performed by: PHYSICIAN ASSISTANT

## 2019-03-24 PROCEDURE — 97161 PT EVAL LOW COMPLEX 20 MIN: CPT | Mod: GP

## 2019-03-24 PROCEDURE — 97165 OT EVAL LOW COMPLEX 30 MIN: CPT | Mod: GO

## 2019-03-24 PROCEDURE — 25800000 HC PHARMACY IV SOLUTIONS: Performed by: PHYSICIAN ASSISTANT

## 2019-03-24 PROCEDURE — 85027 COMPLETE CBC AUTOMATED: CPT | Performed by: PHYSICIAN ASSISTANT

## 2019-03-24 PROCEDURE — 36415 COLL VENOUS BLD VENIPUNCTURE: CPT | Performed by: PHYSICIAN ASSISTANT

## 2019-03-24 PROCEDURE — 84100 ASSAY OF PHOSPHORUS: CPT | Performed by: PHYSICIAN ASSISTANT

## 2019-03-24 RX ORDER — TRAMADOL HYDROCHLORIDE 50 MG/1
50 TABLET ORAL EVERY 6 HOURS PRN
Status: DISCONTINUED | OUTPATIENT
Start: 2019-03-24 | End: 2019-03-25 | Stop reason: HOSPADM

## 2019-03-24 RX ORDER — SODIUM CHLORIDE 9 MG/ML
INJECTION, SOLUTION INTRAVENOUS CONTINUOUS
Status: DISCONTINUED | OUTPATIENT
Start: 2019-03-24 | End: 2019-03-25

## 2019-03-24 RX ADMIN — BACITRACIN 1 APPLICATION.: 500 OINTMENT TOPICAL at 19:34

## 2019-03-24 RX ADMIN — ACETAMINOPHEN 650 MG: 325 TABLET ORAL at 05:50

## 2019-03-24 RX ADMIN — SODIUM CHLORIDE: 9 INJECTION, SOLUTION INTRAVENOUS at 22:56

## 2019-03-24 RX ADMIN — SODIUM CHLORIDE 1000 ML: 9 INJECTION, SOLUTION INTRAVENOUS at 18:18

## 2019-03-24 RX ADMIN — TRAMADOL HYDROCHLORIDE 50 MG: 50 TABLET, COATED ORAL at 10:24

## 2019-03-24 RX ADMIN — ACETAMINOPHEN 650 MG: 325 TABLET ORAL at 14:48

## 2019-03-24 RX ADMIN — TRAMADOL HYDROCHLORIDE 50 MG: 50 TABLET, COATED ORAL at 22:55

## 2019-03-24 RX ADMIN — ACETAMINOPHEN 650 MG: 325 TABLET ORAL at 23:03

## 2019-03-24 RX ADMIN — ACETAMINOPHEN 650 MG: 325 TABLET ORAL at 18:19

## 2019-03-24 RX ADMIN — SENNOSIDES AND DOCUSATE SODIUM 1 TABLET: 8.6; 5 TABLET ORAL at 19:34

## 2019-03-24 RX ADMIN — BACITRACIN 1 APPLICATION.: 500 OINTMENT TOPICAL at 09:45

## 2019-03-24 ASSESSMENT — COGNITIVE AND FUNCTIONAL STATUS - GENERAL
EATING MEALS: 4 - NONE
STANDING UP FROM CHAIR USING ARMS: 4 - NONE
DRESSING REGULAR UPPER BODY CLOTHING: 4 - NONE
CLIMB 3 TO 5 STEPS WITH RAILING: 4 - NONE
HELP NEEDED FOR BATHING: 4 - NONE
MOVING TO AND FROM BED TO CHAIR: 4 - NONE
HELP NEEDED FOR PERSONAL GROOMING: 4 - NONE
DRESSING REGULAR LOWER BODY CLOTHING: 4 - NONE
TOILETING: 4 - NONE
WALKING IN HOSPITAL ROOM: 4 - NONE
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
## 2019-03-24 NOTE — PROGRESS NOTES
Patient: Sudha Drake  Location: [facility] Surgical Step Down 0356  MRN: [id number]  Today's date: 3/24/2019    Pt returned supine in bed with all needs in reach. Her mom is present. RN notified.   Rec d/c to home.     Hospital Course  No notes on file          Therapy Pain/Vitals - 03/24/19 0915        Pain/Comfort/Sleep    Pain Charting Type Pain Assessment    Preferred Pain Scale number (Numeric Rating Pain Scale)    Pain Body Location head    Pain Rating (0-10): Rest 2    Pain Rating (0-10): Activity 2        HR at rest 110, to 150 with functional mobility, recovered quickly back to 110 when seated.      Prior Living Environment      Most Recent Value   Living Environment Comment  Pt will be going to live at her parents, 2SH.  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    Equipment Currently Used at Home  none          Prior Level of Function      Most Recent Value   Ambulation  independent   Transferring  independent   Toileting  independent   Bathing  independent   Dressing  independent   Eating  independent   Communication  understands/communicates without difficulty   Swallowing  swallows foods/liquids without difficulty   Equipment Currently Used at Home  none                OT Evaluation - 03/24/19 0915        Session Details    Document Type initial evaluation    Mode of Treatment occupational therapy       General Information    Patient Profile Reviewed? yes    Existing Precautions/Restrictions weight bearing       Weight Bearing Status    Left UE Weight Bearing Status non-weight bearing       Orientation Log    Comment AAOx3, pt seems anxious.        Cognition/Psychosocial    Safety Awareness intact       Sensory    Sensory General Assessment no sensation deficits identified   UE       Range of Motion (ROM)    General Range of Motion no range of motion deficits identified   UE       Manual Muscle Testing (MMT)    General MMT Assessment no  strength deficits identified   UE       Bed Mobility    [place], Supine to Sit independent       Sit to Stand Transfer    [place], Sit to Stand Transfer independent       Stand to Sit Transfer    [place], Stand to Sit Transfer independent       Upper Body Dressing    Comment Educated on 1 handed technique for UB dressing       Bathing    Comment Educated on compensatory techniques for bathing.       OT Clinical Impression    Impairments Found (OT Eval) ROM (range of motion)    Anticipated Equipment Needs at Discharge none    Daily Outcome Statement Pt is limited by impaired ROM/use of her LUE. She is able to compensate well. She is demonstrating anxiety, but verbalizes that she is feeling fine. Rec d/c to home.           Pain Assessment/Intervention  Pain Charting Type: Pain Assessment             Education provided this session. See the Patient Education summary report for full details.

## 2019-03-24 NOTE — PLAN OF CARE
Problem: Patient Care Overview  Goal: Plan of Care Review  Outcome: Ongoing (interventions implemented as appropriate)   03/24/19 1130   Coping/Psychosocial   Plan Of Care Reviewed With patient   Plan of Care Review   Progress progress toward functional goals as expected   Outcome Summary PT to sign off
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
## 2019-03-24 NOTE — PROGRESS NOTES
Notified patient had periodic tachycardia with mobility at times up to 150s with recovery with when return to rest. Patient asymptomatic. Will continue to monitor, IVF bolus 1L with gentle hydration overnight, and encourage patient to change positions slowly. Continue assistance with mobility and telemetry monitoring.
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
## 2019-03-24 NOTE — ANESTHESIA POSTPROCEDURE EVALUATION
Patient: Nichole Holbrook    Procedure Summary     Date:  03/23/19 Room / Location:  [facility] OR 6 / [facility] OR    Anesthesia Start:  0932 Anesthesia Stop:  1532    Procedure:  OPEN REDUCTION INTERNAL FIXATION RADIAL /ULNAR(BOTH BONE ) FOREARM  FRACTURE (Left ) Diagnosis:       Forearm fractures, both bones, closed, left, initial encounter      (Forearm fractures, both bones, closed, left, initial encounter [S52.92XA, S52.202A])    Surgeon:  Angel Raines DO Responsible Provider:  Valentina Colunga MD    Anesthesia Type:  general ASA Status:  2 - Emergent          Anesthesia Type: general  PACU Vitals  3/23/2019 1527 - 3/23/2019 1627      3/23/2019 1535 3/23/2019 1545 3/23/2019 1600 3/23/2019 1615    BP: 119/62 122/61 122/65 119/63    Temp: 37.9 °C (100.3 °F) - - -    Pulse: (!)  122 (!)  108 (!)  130 (!)  126    Resp: 15 17 18 12    SpO2: 98 % 99 % 99 % 99 %            Anesthesia Post Evaluation    Pain management: adequate  Patient participation: complete - patient participated  Level of consciousness: awake and alert  Cardiovascular status: acceptable  Airway Patency: adequate  Respiratory status: acceptable  Hydration status: acceptable  Anesthetic complications: no
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
## 2019-03-24 NOTE — PLAN OF CARE
Problem: Patient Care Overview  Goal: Plan of Care Review  Outcome: Ongoing (interventions implemented as appropriate)   03/24/19 1106   Coping/Psychosocial   Plan Of Care Reviewed With patient;mother   Plan of Care Review   Progress progress toward functional goals as expected   Outcome Summary OT Evaluation completed. D/C OT.     Goal: Individualization & Mutuality  Outcome: Outcome(s) Achieved Date Met: 03/24/19 03/24/19 1106   Individualization   Patient Specific Goals to go home

## 2019-03-24 NOTE — PLAN OF CARE
Problem: Patient Care Overview  Goal: Plan of Care Review  Outcome: Ongoing (interventions implemented as appropriate)   03/24/19 0509   Coping/Psychosocial   Plan Of Care Reviewed With patient   Plan of Care Review   Progress improving   Outcome Summary Patient on RA maintain   03/24/19 0509   Coping/Psychosocial   Plan Of Care Reviewed With patient   Plan of Care Review   Progress improving   Outcome Summary Patient on RA maintained sat > 95%   ed sat > 95%       Problem: Fall Risk (Adult)  Goal: Absence of Falls  Outcome: Ongoing (interventions implemented as appropriate)

## 2019-03-25 VITALS
BODY MASS INDEX: 19.49 KG/M2 | SYSTOLIC BLOOD PRESSURE: 128 MMHG | DIASTOLIC BLOOD PRESSURE: 73 MMHG | WEIGHT: 121.25 LBS | HEIGHT: 66 IN | TEMPERATURE: 98.6 F | RESPIRATION RATE: 18 BRPM | OXYGEN SATURATION: 100 % | HEART RATE: 112 BPM

## 2019-03-25 LAB
ANION GAP SERPL CALC-SCNC: 10 MEQ/L (ref 3–15)
BUN SERPL-MCNC: 6 MG/DL (ref 8–20)
CALCIUM SERPL-MCNC: 8.7 MG/DL (ref 8.9–10.3)
CHLORIDE SERPL-SCNC: 113 MEQ/L (ref 98–109)
CO2 SERPL-SCNC: 21 MEQ/L (ref 22–32)
CREAT SERPL-MCNC: 0.6 MG/DL
CROSSMATCH: NORMAL
CROSSMATCH: NORMAL
ERYTHROCYTE [DISTWIDTH] IN BLOOD BY AUTOMATED COUNT: 13 %
GFR SERPL CREATININE-BSD FRML MDRD: ABNORMAL ML/MIN/1.73M*2
GLUCOSE SERPL-MCNC: 84 MG/DL (ref 70–99)
HCT VFR BLDCO AUTO: 32.7 %
HGB BLD-MCNC: 10.8 G/DL
ISBT CODE: 9500
ISBT CODE: 9500
MAGNESIUM SERPL-MCNC: 1.9 MG/DL (ref 1.8–2.5)
MCH RBC QN AUTO: 29.9 PG
MCHC RBC AUTO-ENTMCNC: 33 G/DL
MCV RBC AUTO: 90.6 FL
PDW BLD AUTO: 12.2 FL
PHOSPHATE SERPL-MCNC: 2.9 MG/DL (ref 2.4–4.7)
PLATELET # BLD AUTO: 207 K/UL
POTASSIUM SERPL-SCNC: 3.8 MEQ/L (ref 3.6–5.1)
PRODUCT CODE: NORMAL
PRODUCT CODE: NORMAL
PRODUCT STATUS: NORMAL
PRODUCT STATUS: NORMAL
RBC # BLD AUTO: 3.61 M/UL
SODIUM SERPL-SCNC: 144 MEQ/L (ref 136–144)
SPECIMEN EXP DATE BLD: NORMAL
SPECIMEN EXP DATE BLD: NORMAL
UNIT ABO: NORMAL
UNIT ABO: NORMAL
UNIT ID: NORMAL
UNIT ID: NORMAL
UNIT RH: NEGATIVE
UNIT RH: NEGATIVE
WBC # BLD AUTO: 8.7 K/UL

## 2019-03-25 PROCEDURE — 85027 COMPLETE CBC AUTOMATED: CPT | Performed by: PHYSICIAN ASSISTANT

## 2019-03-25 PROCEDURE — 63700000 HC SELF-ADMINISTRABLE DRUG: Performed by: NURSE PRACTITIONER

## 2019-03-25 PROCEDURE — 84100 ASSAY OF PHOSPHORUS: CPT | Performed by: PHYSICIAN ASSISTANT

## 2019-03-25 PROCEDURE — 63700000 HC SELF-ADMINISTRABLE DRUG: Performed by: PHYSICIAN ASSISTANT

## 2019-03-25 PROCEDURE — 36415 COLL VENOUS BLD VENIPUNCTURE: CPT | Performed by: PHYSICIAN ASSISTANT

## 2019-03-25 PROCEDURE — 97164 PT RE-EVAL EST PLAN CARE: CPT | Mod: GP

## 2019-03-25 PROCEDURE — 83735 ASSAY OF MAGNESIUM: CPT | Performed by: PHYSICIAN ASSISTANT

## 2019-03-25 PROCEDURE — 80048 BASIC METABOLIC PNL TOTAL CA: CPT | Performed by: PHYSICIAN ASSISTANT

## 2019-03-25 RX ORDER — ACETAMINOPHEN 325 MG/1
975 TABLET ORAL EVERY 6 HOURS
Refills: 0
Start: 2019-03-25 | End: 2019-05-10

## 2019-03-25 RX ORDER — POLYETHYLENE GLYCOL 3350 17 G/17G
17 POWDER, FOR SOLUTION ORAL DAILY
Status: DISCONTINUED | OUTPATIENT
Start: 2019-03-25 | End: 2019-03-25 | Stop reason: HOSPADM

## 2019-03-25 RX ORDER — LANOLIN ALCOHOL/MO/W.PET/CERES
800 CREAM (GRAM) TOPICAL ONCE
Status: COMPLETED | OUTPATIENT
Start: 2019-03-25 | End: 2019-03-25

## 2019-03-25 RX ORDER — TRAMADOL HYDROCHLORIDE 50 MG/1
50 TABLET ORAL EVERY 6 HOURS PRN
Qty: 24 TABLET | Refills: 0 | Status: SHIPPED | OUTPATIENT
Start: 2019-03-25 | End: 2019-03-29 | Stop reason: ALTCHOICE

## 2019-03-25 RX ORDER — AMOXICILLIN 250 MG
1 CAPSULE ORAL 2 TIMES DAILY
Refills: 0
Start: 2019-03-25 | End: 2019-03-29 | Stop reason: ALTCHOICE

## 2019-03-25 RX ORDER — POTASSIUM CHLORIDE 750 MG/1
20 TABLET, FILM COATED, EXTENDED RELEASE ORAL ONCE
Status: COMPLETED | OUTPATIENT
Start: 2019-03-25 | End: 2019-03-25

## 2019-03-25 RX ADMIN — POLYETHYLENE GLYCOL 3350 17 G: 17 POWDER, FOR SOLUTION ORAL at 12:19

## 2019-03-25 RX ADMIN — POTASSIUM CHLORIDE 20 MEQ: 750 TABLET, FILM COATED, EXTENDED RELEASE ORAL at 12:19

## 2019-03-25 RX ADMIN — ACETAMINOPHEN 650 MG: 325 TABLET ORAL at 12:20

## 2019-03-25 RX ADMIN — TRAMADOL HYDROCHLORIDE 50 MG: 50 TABLET, COATED ORAL at 10:02

## 2019-03-25 RX ADMIN — ACETAMINOPHEN 650 MG: 325 TABLET ORAL at 06:09

## 2019-03-25 RX ADMIN — BACITRACIN 1 APPLICATION.: 500 OINTMENT TOPICAL at 10:08

## 2019-03-25 RX ADMIN — Medication 800 MG: at 12:19

## 2019-03-25 RX ADMIN — SENNOSIDES AND DOCUSATE SODIUM 1 TABLET: 8.6; 5 TABLET ORAL at 10:06

## 2019-03-25 ASSESSMENT — COGNITIVE AND FUNCTIONAL STATUS - GENERAL
WALKING IN HOSPITAL ROOM: 4 - NONE
STANDING UP FROM CHAIR USING ARMS: 4 - NONE
CLIMB 3 TO 5 STEPS WITH RAILING: 4 - NONE
MOVING TO AND FROM BED TO CHAIR: 4 - NONE

## 2019-03-25 NOTE — DISCHARGE INSTRUCTIONS
Plan:  Follow-up with Dr. Raines at [facility] office at [facility] in 2 weeks. Please call [phone] to make an appointment, address is:   [facility]   4 Floor, [address] of Medical office building  [address] [place] [address]    Please have your sutures removed in 5 -7 days.  This can be done at your Primary Care office.   Follow-up with your Primary Care Physician in 1 week to make them aware of your recent hospitalization.  Be sure to take a stool softener, such as Colace, while taking prescription pain medications to help prevent constipation.  Avoid bright lights, computers, all electronics and heavy activity until you are feeling better without headache or nausea.  You can follow up with neurosurgery as needed, Dr. Huang.    Forearm Fracture  A forearm fracture is a break in one or both of the bones of your arm that are between the elbow and the wrist. Your forearm is made up of two bones called the radius and the ulna.  Some forearm fractures will require surgery.  Follow these instructions at home:  If you have a cast:  · Do not stick anything inside the cast to scratch your skin.  · Check the skin around the cast every day. Tell your doctor about any concerns. You may put lotion on dry skin around the edges of the cast, but not on the skin underneath the cast.  If you have a splint:  · Wear it as told by your doctor. Remove it only as told by your doctor.  · Loosen the splint if your fingers become numb and tingle, or if they turn cold and blue.  Bathing  · Cover the cast or splint with a watertight plastic bag to protect it from water while you take a bath or a shower. Do not let the cast or splint get wet.  Managing pain, stiffness, and swelling  · If told, apply ice to the injured area:  ¨ Put ice in a plastic bag.  ¨ Place a towel between your skin and the bag.  ¨ Leave the ice on for 20 minutes, 2-3 times a day.  · Move your fingers  often to avoid stiffness and to lessen swelling.  · Raise the injured area above the level of your heart while you are sitting or lying down.  Driving  · Do not drive or use heavy machinery while taking pain medicine.  · Do not drive while wearing a cast or splint on a hand that you use for driving.  Activity  · Return to your normal activities as told by your doctor. Ask your doctor what activities are safe for you.  · Do range-of-motion exercises only as told by your doctor.  Safety  · Do not use your injured limb to support your body weight until your doctor says that you can.  General instructions  · Do not put pressure on any part of the cast or splint until it is fully hardened. This may take several hours.  · Keep the cast or splint clean and dry.  · Do not use any tobacco products, including cigarettes, chewing tobacco, or electronic cigarettes. Tobacco can delay bone healing. If you need help quitting, ask your doctor.  · Take medicines only as told by your doctor.  · Keep all follow-up visits as told by your doctor. This is important.  Contact a doctor if:  · Your pain medicine is not helping.  · Your cast breaks or gets damaged.  · Your cast gets loose.  · Your cast feels too tight.  · Your cast gets wet.  · You have more severe pain or swelling than you did before the cast.  · You have severe pain when you stretch your fingers.  · You continue to have pain or stiffness in your elbow or your wrist after your cast is taken off.  Get help right away if:  · You cannot move your fingers.  · You lose feeling in your fingers or your hand.  · Your hand or your fingers turn cold and pale or blue.  · You notice a bad smell coming from your cast.  · You have fluid or drainage from underneath your cast.  · You have new stains from blood, fluid, or drainage that is coming through your cast.  This information is not intended to replace advice given to you by your health care provider. Make sure you discuss any  questions you have with your health care provider.    Traumatic Brain Injury (TBI)    Brain injuries are classified as traumatic or non-traumatic. A traumatic brain injury (TBI) results from a blow to the head, such as from a fall, a car accident, a gunshot wound or an explosion. A non-traumatic brain injury (NTBI), which involves no external force, may result from a stroke, a tumor, an infection, or a lack of oxygen.    Since the brain is our center for speech, thinking, movement and emotion, brain injuries can affect our abilities in many different ways, depending on the severity of the injury.    Brain injuries may have physical and emotional symptoms.  Brain injury symptoms vary from person to person. With TBI or NTBI you may feel depressed or have trouble controlling your emotions, or you may feel like you’ve lost your ability to control how you speak or walk.    You may experience physical symptoms, such as:  • Blurred vision  • Headaches  • Lack of coordination  • Loss of control of bowel and bladder  • Seizures  •   Or emotional symptoms, such as:  • Changes in personality  • Difficulty finding the right words  • Difficulty remembering things  • Inappropriate behaviors  • Mood swings  •   Treatment of TBI   Your path to recovery depends on what type of brain injury you have and how it has affected your ability to live your life. Most rehabilitation centers have an entire team of brain injury experts to help every step of the way, from intensive care to rehabilitative services to outpatient therapy. It is imperative to follow-up with a specialist as a part of your recovery.  Please look at the following locations and choose a facility that in order to help with your next phase of the rehabilitative process.     1. [facility]  [phone]  [place] [address]  2. [facility] ([facility])   [phone]  [address] [place] [address]  3. [facility]  [phone]  [address]  [place] [address]  4. [facility]  [phone]  [address]  [place] [address]  5. [facility]  [phone]  [address]  [place] [address]  6. [facility]  [phone]

## 2019-03-25 NOTE — DISCHARGE INSTR - APPOINTMENTS
OUTPATIENT MENTAL HEALTH PROVIDERS       [facility] [employer] [facility]   [address] [place] [address]   [phone]     [facility]   [address]   [place] [address]   [phone]     [facility]  Outpatient Behavioral Health   [address]   [address]   [place] [address]   [phone]    [facility]  [facility]   [address] [place] [address]   [phone]     [facility]  Adult Outpatient Mental Health Program   [address] [place] [address]   [phone]      [facility]  [address]   2nd Floor   [place] [address]   [phone]
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
## 2019-03-25 NOTE — PROGRESS NOTES
I have queried the state Prescription Drug Monitoring Program [PDMP] and found no suspicious PDMP activity and I will prescribe a controlled medication(s).

## 2019-03-25 NOTE — PROGRESS NOTES
Patient: Sudha Drake  Procedure(s):  OPEN REDUCTION INTERNAL FIXATION RADIAL /ULNAR(BOTH BONE ) FOREARM  FRACTURE  Patient location: [facility] Surgical Floor    Last vitals:   Vitals:    03/24/19 1900   BP: 120/75   Pulse: 77   Resp: 18   Temp: 36.7 °C (98 °F)   SpO2:      Level of consciousness: awake, alert and oriented  Post-anesthesia pain: adequate analgesia  Anesthetic complications: no

## 2019-03-25 NOTE — PLAN OF CARE
Problem: Patient Care Overview  Goal: Plan of Care Review  Outcome: Ongoing (interventions implemented as appropriate)   03/25/19 0606   Coping/Psychosocial   Plan Of Care Reviewed With patient   Plan of Care Review   Progress improving   Outcome Summary pts pain was under control during this shift        Problem: Fall Risk (Adult)  Goal: Absence of Falls  Outcome: Ongoing (interventions implemented as appropriate)   03/25/19 0606   Fall Risk (Adult)   Absence of Falls achieves outcome
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
## 2019-03-26 ENCOUNTER — TELEPHONE (OUTPATIENT)
Dept: FAMILY MEDICINE CLINIC | Facility: CLINIC | Age: 24
End: 2019-03-26
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
## 2019-03-28 RX ORDER — FLUTICASONE PROPIONATE 50 MCG
SPRAY, SUSPENSION (ML) NASAL
COMMUNITY
Start: 2019-01-07

## 2019-03-28 RX ORDER — TRAMADOL HYDROCHLORIDE 50 MG/1
TABLET ORAL
Refills: 0 | COMMUNITY
Start: 2019-03-25 | End: 2019-05-06 | Stop reason: ALTCHOICE

## 2019-03-28 RX ORDER — ALBUTEROL SULFATE 90 UG/1
2 AEROSOL, METERED RESPIRATORY (INHALATION) EVERY 4 HOURS PRN
COMMUNITY
Start: 2014-06-13 | End: 2020-06-08

## 2019-03-29 ENCOUNTER — APPOINTMENT (EMERGENCY)
Dept: RADIOLOGY | Facility: HOSPITAL | Age: 24
End: 2019-03-29
Attending: EMERGENCY MEDICINE
Payer: COMMERCIAL

## 2019-03-29 ENCOUNTER — HOSPITAL ENCOUNTER (OUTPATIENT)
Dept: MRI IMAGING | Facility: HOSPITAL | Age: 24
Discharge: HOME | End: 2019-03-29
Payer: COMMERCIAL

## 2019-03-29 ENCOUNTER — HOSPITAL ENCOUNTER (EMERGENCY)
Facility: HOSPITAL | Age: 24
Discharge: HOME | End: 2019-03-30
Attending: EMERGENCY MEDICINE
Payer: COMMERCIAL

## 2019-03-29 ENCOUNTER — OFFICE VISIT (OUTPATIENT)
Dept: FAMILY MEDICINE CLINIC | Facility: CLINIC | Age: 24
End: 2019-03-29
Payer: COMMERCIAL

## 2019-03-29 ENCOUNTER — DOCUMENTATION (OUTPATIENT)
Dept: OBGYN CLINIC | Facility: CLINIC | Age: 24
End: 2019-03-29

## 2019-03-29 ENCOUNTER — TELEPHONE (OUTPATIENT)
Dept: OBGYN CLINIC | Facility: MEDICAL CENTER | Age: 24
End: 2019-03-29

## 2019-03-29 ENCOUNTER — OFFICE VISIT (OUTPATIENT)
Dept: OBGYN CLINIC | Facility: CLINIC | Age: 24
End: 2019-03-29
Payer: COMMERCIAL

## 2019-03-29 VITALS
RESPIRATION RATE: 15 BRPM | BODY MASS INDEX: 18.85 KG/M2 | WEIGHT: 117.3 LBS | DIASTOLIC BLOOD PRESSURE: 70 MMHG | HEIGHT: 66 IN | SYSTOLIC BLOOD PRESSURE: 108 MMHG | HEART RATE: 80 BPM | TEMPERATURE: 98.1 F

## 2019-03-29 VITALS
BODY MASS INDEX: 19.13 KG/M2 | DIASTOLIC BLOOD PRESSURE: 78 MMHG | HEIGHT: 66 IN | WEIGHT: 119 LBS | SYSTOLIC BLOOD PRESSURE: 124 MMHG

## 2019-03-29 DIAGNOSIS — G44.311 INTRACTABLE ACUTE POST-TRAUMATIC HEADACHE: ICD-10-CM

## 2019-03-29 DIAGNOSIS — S06.0X1A CONCUSSION WITH LOSS OF CONSCIOUSNESS OF 30 MINUTES OR LESS, INITIAL ENCOUNTER: Primary | ICD-10-CM

## 2019-03-29 DIAGNOSIS — S52.202A FOREARM FRACTURES, BOTH BONES, CLOSED, LEFT, INITIAL ENCOUNTER: ICD-10-CM

## 2019-03-29 DIAGNOSIS — S06.0X1A CONCUSSION WITH LOSS OF CONSCIOUSNESS OF 30 MINUTES OR LESS, INITIAL ENCOUNTER: ICD-10-CM

## 2019-03-29 DIAGNOSIS — H51.11 CONVERGENCE INSUFFICIENCY: ICD-10-CM

## 2019-03-29 DIAGNOSIS — F07.81 POST CONCUSSION SYNDROME: Primary | ICD-10-CM

## 2019-03-29 DIAGNOSIS — S06.361A TRAUMATIC HEMORRHAGE OF CEREBRUM WITH LOSS OF CONSCIOUSNESS OF 30 MINUTES OR LESS, UNSPECIFIED LATERALITY, INITIAL ENCOUNTER (HCC): ICD-10-CM

## 2019-03-29 DIAGNOSIS — S52.92XA FOREARM FRACTURES, BOTH BONES, CLOSED, LEFT, INITIAL ENCOUNTER: ICD-10-CM

## 2019-03-29 DIAGNOSIS — S06.361D TRAUMATIC HEMORRHAGE OF CEREBRUM WITH LOSS OF CONSCIOUSNESS OF 30 MINUTES OR LESS, UNSPECIFIED LATERALITY, SUBSEQUENT ENCOUNTER: ICD-10-CM

## 2019-03-29 DIAGNOSIS — S01.01XD LACERATION OF SCALP, SUBSEQUENT ENCOUNTER: ICD-10-CM

## 2019-03-29 PROBLEM — S01.01XA SCALP LACERATION: Status: ACTIVE | Noted: 2019-03-22

## 2019-03-29 LAB
ANION GAP SERPL CALC-SCNC: 12 MEQ/L (ref 3–15)
BASOPHILS # BLD: 0.04 K/UL (ref 0.01–0.1)
BASOPHILS NFR BLD: 0.4 %
BUN SERPL-MCNC: 8 MG/DL (ref 8–20)
CALCIUM SERPL-MCNC: 9.6 MG/DL (ref 8.9–10.3)
CHLORIDE SERPL-SCNC: 106 MEQ/L (ref 98–109)
CO2 SERPL-SCNC: 21 MEQ/L (ref 22–32)
CREAT SERPL-MCNC: 0.6 MG/DL
DIFFERENTIAL METHOD BLD: NORMAL
EOSINOPHIL # BLD: 0.12 K/UL (ref 0.04–0.36)
EOSINOPHIL NFR BLD: 1.1 %
ERYTHROCYTE [DISTWIDTH] IN BLOOD BY AUTOMATED COUNT: 13.1 % (ref 11.7–14.4)
GFR SERPL CREATININE-BSD FRML MDRD: >60 ML/MIN/1.73M*2
GLUCOSE SERPL-MCNC: 110 MG/DL (ref 70–99)
HCG UR QL: NEGATIVE
HCT VFR BLDCO AUTO: 43.1 %
HGB BLD-MCNC: 14.4 G/DL
IMM GRANULOCYTES # BLD AUTO: 0.03 K/UL (ref 0–0.08)
IMM GRANULOCYTES NFR BLD AUTO: 0.3 %
LYMPHOCYTES # BLD: 3.05 K/UL (ref 1.2–3.5)
LYMPHOCYTES NFR BLD: 28.6 %
MCH RBC QN AUTO: 29.9 PG (ref 28–33.2)
MCHC RBC AUTO-ENTMCNC: 33.4 G/DL (ref 32.2–35.5)
MCV RBC AUTO: 89.4 FL (ref 83–98)
MONOCYTES # BLD: 0.64 K/UL (ref 0.28–0.8)
MONOCYTES NFR BLD: 6 %
NEUTROPHILS # BLD: 6.77 K/UL (ref 1.7–7)
NEUTS SEG NFR BLD: 63.6 %
NRBC BLD-RTO: 0 %
PDW BLD AUTO: 11.3 FL (ref 9.4–12.3)
PLATELET # BLD AUTO: 401 K/UL
POTASSIUM SERPL-SCNC: 3.9 MEQ/L (ref 3.6–5.1)
RBC # BLD AUTO: 4.82 M/UL (ref 3.93–5.22)
SODIUM SERPL-SCNC: 139 MEQ/L (ref 136–144)
WBC # BLD AUTO: 10.65 K/UL

## 2019-03-29 PROCEDURE — 99214 OFFICE O/P EST MOD 30 MIN: CPT | Performed by: PHYSICIAN ASSISTANT

## 2019-03-29 PROCEDURE — 84703 CHORIONIC GONADOTROPIN ASSAY: CPT | Performed by: PHYSICIAN ASSISTANT

## 2019-03-29 PROCEDURE — 99284 EMERGENCY DEPT VISIT MOD MDM: CPT | Mod: 25

## 2019-03-29 PROCEDURE — 85025 COMPLETE CBC W/AUTO DIFF WBC: CPT | Performed by: PHYSICIAN ASSISTANT

## 2019-03-29 PROCEDURE — 70551 MRI BRAIN STEM W/O DYE: CPT

## 2019-03-29 PROCEDURE — 99205 OFFICE O/P NEW HI 60 MIN: CPT | Performed by: FAMILY MEDICINE

## 2019-03-29 PROCEDURE — 80048 BASIC METABOLIC PNL TOTAL CA: CPT | Performed by: PHYSICIAN ASSISTANT

## 2019-03-29 PROCEDURE — 70450 CT HEAD/BRAIN W/O DYE: CPT

## 2019-03-29 PROCEDURE — 36415 COLL VENOUS BLD VENIPUNCTURE: CPT | Performed by: PHYSICIAN ASSISTANT

## 2019-03-29 RX ORDER — TRAMADOL HYDROCHLORIDE 50 MG/1
TABLET ORAL
COMMUNITY
Start: 2019-03-25

## 2019-03-29 RX ORDER — RIBOFLAVIN (VITAMIN B2) 100 MG
200 TABLET ORAL
COMMUNITY
Start: 2019-03-29

## 2019-03-29 RX ORDER — SUMATRIPTAN 100 MG/1
100 TABLET, FILM COATED ORAL
COMMUNITY
Start: 2015-08-13 | End: 2019-05-06 | Stop reason: ALTCHOICE

## 2019-03-29 RX ORDER — SUMATRIPTAN SUCCINATE 100 MG/1
100 TABLET ORAL
COMMUNITY
Start: 2015-08-13

## 2019-03-29 RX ORDER — NORETHINDRONE ACETATE AND ETHINYL ESTRADIOL .02; 1 MG/1; MG/1
1 TABLET ORAL
COMMUNITY
Start: 2018-10-22

## 2019-03-29 RX ORDER — ACETAMINOPHEN 325 MG/1
975 TABLET ORAL
COMMUNITY
Start: 2019-03-25

## 2019-03-29 RX ORDER — CALCIUM CARBONATE 300MG(750)
1 TABLET,CHEWABLE ORAL DAILY
Qty: 30 TABLET | Refills: 0 | Status: SHIPPED | OUTPATIENT
Start: 2019-03-29 | End: 2020-06-08

## 2019-03-29 RX ORDER — RIZATRIPTAN BENZOATE 10 MG/1
TABLET, ORALLY DISINTEGRATING ORAL
COMMUNITY
Start: 2018-04-10

## 2019-03-29 RX ORDER — ALBUTEROL SULFATE 90 UG/1
2 INHALANT RESPIRATORY (INHALATION)
COMMUNITY
Start: 2014-06-13

## 2019-03-29 RX ORDER — ACETAMINOPHEN 325 MG/1
975 TABLET ORAL
COMMUNITY
Start: 2019-03-25 | End: 2020-06-08

## 2019-03-29 NOTE — LETTER
March 29, 2019     Patient: Jonny Chew   YOB: 1995   Date of Visit: 3/29/2019       To Whom it May Concern:    Jonny Chew is under my professional care  She was seen in my office on 3/29/2019  She should not return to work at this time until further notice due to her current medical condition  If you have any questions or concerns, please don't hesitate to call           Sincerely,          Niecy Masters DO        CC: Jonny Chew
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
## 2019-03-29 NOTE — ASSESSMENT & PLAN NOTE
Headaches consistent with concussion syndrome  Will recommend continue with Tylenol every 6 hours as needed for pain  Additionally I have sent in magnesium and vitamin B2 to the pharmacy  Patient should continue avoiding any environment of bright lights loud noises  She should also avoid physical exertional activity as calorie expenditure may worsen her symptoms and ultimately prolong her recovery  A work note has been provided stating no work at this time until further notice  Return the office for follow-up in 1 week

## 2019-03-29 NOTE — TELEPHONE ENCOUNTER
I was called with stat MRI Brain results  I discussed with Dr Castro Allison who saw the patient earlier today  It is recommended for the patient to proceed to the emergency room  I called the patient and spoke with her mother  The patient was in the car with her  I let them know to proceed to the emergency room  Her mother was resistant to this as they were told at [facility] that she did not need follow-up  I let her know that in the setting of her worsening symptoms and MRI findings that it was best for her to the proceed to the emergency room and that I am passing on the message from her physician  She continues to resist this advice and is wondering if her previous reports were reviewed  I let her know I would contact Dr Castro Allison with her concerns  I spoke with Dr Castro Allison who will contact the patient directly
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
## 2019-03-30 ENCOUNTER — DOCUMENTATION (OUTPATIENT)
Dept: TRAUMA SURGERY | Facility: HOSPITAL | Age: 24
End: 2019-03-30

## 2019-03-30 VITALS
TEMPERATURE: 98.2 F | DIASTOLIC BLOOD PRESSURE: 76 MMHG | SYSTOLIC BLOOD PRESSURE: 126 MMHG | OXYGEN SATURATION: 100 % | HEIGHT: 67 IN | WEIGHT: 120 LBS | HEART RATE: 99 BPM | BODY MASS INDEX: 18.83 KG/M2 | RESPIRATION RATE: 16 BRPM

## 2019-03-30 PROCEDURE — 63700000 HC SELF-ADMINISTRABLE DRUG: Performed by: PHYSICIAN ASSISTANT

## 2019-03-30 RX ORDER — ACETAMINOPHEN 325 MG/1
650 TABLET ORAL ONCE
Status: COMPLETED | OUTPATIENT
Start: 2019-03-30 | End: 2019-03-30

## 2019-03-30 RX ADMIN — ACETAMINOPHEN 650 MG: 325 TABLET ORAL at 01:14

## 2019-03-30 NOTE — PROGRESS NOTES
Trauma    Pt's repeat head CT shows no new bleed.  Given that she is improving since her trauma with no new trauma she is okay to go and continue her outpatient follow up.    Sudarshan Davis MD

## 2019-03-30 NOTE — PROGRESS NOTES
Trauma Attdg    Pt has been improving since her discharge from hospital.  She had follow up with a neurologist for her concussion.  He ordered an MRI of the brain that showed a 3 mmSDH.  She is neuro intact with no new traumas and her concussion symptoms have been improving.  Would repeat head CT in ED.  Consult to neurosurgery.    Sudarshan Davis MD
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
## 2019-04-01 ENCOUNTER — TELEPHONE (OUTPATIENT)
Dept: OBGYN CLINIC | Facility: HOSPITAL | Age: 24
End: 2019-04-01

## 2019-04-03 ENCOUNTER — OFFICE VISIT (OUTPATIENT)
Dept: OBGYN CLINIC | Facility: CLINIC | Age: 24
End: 2019-04-03
Payer: COMMERCIAL

## 2019-04-03 ENCOUNTER — TELEPHONE (OUTPATIENT)
Dept: OBGYN CLINIC | Facility: HOSPITAL | Age: 24
End: 2019-04-03

## 2019-04-03 ENCOUNTER — EVALUATION (OUTPATIENT)
Dept: OCCUPATIONAL THERAPY | Facility: CLINIC | Age: 24
End: 2019-04-03
Payer: COMMERCIAL

## 2019-04-03 ENCOUNTER — TRANSCRIBE ORDERS (OUTPATIENT)
Dept: OCCUPATIONAL THERAPY | Facility: CLINIC | Age: 24
End: 2019-04-03

## 2019-04-03 VITALS
BODY MASS INDEX: 19.29 KG/M2 | DIASTOLIC BLOOD PRESSURE: 68 MMHG | HEART RATE: 78 BPM | WEIGHT: 120 LBS | SYSTOLIC BLOOD PRESSURE: 120 MMHG | HEIGHT: 66 IN

## 2019-04-03 DIAGNOSIS — S52.202A FOREARM FRACTURES, BOTH BONES, CLOSED, LEFT, INITIAL ENCOUNTER: Primary | ICD-10-CM

## 2019-04-03 DIAGNOSIS — G44.311 INTRACTABLE ACUTE POST-TRAUMATIC HEADACHE: ICD-10-CM

## 2019-04-03 DIAGNOSIS — S52.92XA FOREARM FRACTURES, BOTH BONES, CLOSED, LEFT, INITIAL ENCOUNTER: Primary | ICD-10-CM

## 2019-04-03 DIAGNOSIS — S06.0X1D CONCUSSION WITH LOSS OF CONSCIOUSNESS OF 30 MINUTES OR LESS, SUBSEQUENT ENCOUNTER: Primary | ICD-10-CM

## 2019-04-03 DIAGNOSIS — S06.361D TRAUMATIC HEMORRHAGE OF CEREBRUM WITH LOSS OF CONSCIOUSNESS OF 30 MINUTES OR LESS, UNSPECIFIED LATERALITY, SUBSEQUENT ENCOUNTER: ICD-10-CM

## 2019-04-03 PROCEDURE — 97110 THERAPEUTIC EXERCISES: CPT | Performed by: OCCUPATIONAL THERAPIST

## 2019-04-03 PROCEDURE — 97760 ORTHOTIC MGMT&TRAING 1ST ENC: CPT | Performed by: OCCUPATIONAL THERAPIST

## 2019-04-03 PROCEDURE — 97166 OT EVAL MOD COMPLEX 45 MIN: CPT | Performed by: OCCUPATIONAL THERAPIST

## 2019-04-03 PROCEDURE — 99214 OFFICE O/P EST MOD 30 MIN: CPT | Performed by: FAMILY MEDICINE

## 2019-04-03 PROCEDURE — 97140 MANUAL THERAPY 1/> REGIONS: CPT | Performed by: OCCUPATIONAL THERAPIST

## 2019-04-05 ENCOUNTER — OFFICE VISIT (OUTPATIENT)
Dept: OCCUPATIONAL THERAPY | Facility: CLINIC | Age: 24
End: 2019-04-05
Payer: COMMERCIAL

## 2019-04-05 DIAGNOSIS — S52.202A FOREARM FRACTURES, BOTH BONES, CLOSED, LEFT, INITIAL ENCOUNTER: Primary | ICD-10-CM

## 2019-04-05 DIAGNOSIS — S52.92XA FOREARM FRACTURES, BOTH BONES, CLOSED, LEFT, INITIAL ENCOUNTER: Primary | ICD-10-CM

## 2019-04-05 PROCEDURE — 97110 THERAPEUTIC EXERCISES: CPT

## 2019-04-05 PROCEDURE — 97140 MANUAL THERAPY 1/> REGIONS: CPT

## 2019-04-08 ENCOUNTER — APPOINTMENT (OUTPATIENT)
Dept: OCCUPATIONAL THERAPY | Facility: CLINIC | Age: 24
End: 2019-04-08
Payer: COMMERCIAL

## 2019-04-08 ENCOUNTER — OFFICE VISIT (OUTPATIENT)
Dept: OCCUPATIONAL THERAPY | Facility: CLINIC | Age: 24
End: 2019-04-08
Payer: COMMERCIAL

## 2019-04-08 DIAGNOSIS — S52.92XA FOREARM FRACTURES, BOTH BONES, CLOSED, LEFT, INITIAL ENCOUNTER: Primary | ICD-10-CM

## 2019-04-08 DIAGNOSIS — S52.202A FOREARM FRACTURES, BOTH BONES, CLOSED, LEFT, INITIAL ENCOUNTER: Primary | ICD-10-CM

## 2019-04-08 PROCEDURE — 97110 THERAPEUTIC EXERCISES: CPT | Performed by: OCCUPATIONAL THERAPIST

## 2019-04-08 PROCEDURE — 97140 MANUAL THERAPY 1/> REGIONS: CPT | Performed by: OCCUPATIONAL THERAPIST

## 2019-04-10 ENCOUNTER — TELEPHONE (OUTPATIENT)
Dept: FAMILY MEDICINE CLINIC | Facility: CLINIC | Age: 24
End: 2019-04-10

## 2019-04-10 DIAGNOSIS — M54.2 NECK PAIN: Primary | ICD-10-CM

## 2019-04-11 ENCOUNTER — TELEPHONE (OUTPATIENT)
Dept: OBGYN CLINIC | Facility: CLINIC | Age: 24
End: 2019-04-11

## 2019-04-11 ENCOUNTER — EVALUATION (OUTPATIENT)
Dept: PHYSICAL THERAPY | Facility: CLINIC | Age: 24
End: 2019-04-11
Payer: COMMERCIAL

## 2019-04-11 ENCOUNTER — OFFICE VISIT (OUTPATIENT)
Dept: OCCUPATIONAL THERAPY | Facility: CLINIC | Age: 24
End: 2019-04-11
Payer: COMMERCIAL

## 2019-04-11 DIAGNOSIS — S52.92XA FOREARM FRACTURES, BOTH BONES, CLOSED, LEFT, INITIAL ENCOUNTER: Primary | ICD-10-CM

## 2019-04-11 DIAGNOSIS — M54.2 NECK PAIN: ICD-10-CM

## 2019-04-11 DIAGNOSIS — M43.6 ACUTE MUSCLE STIFFNESS OF NECK: Primary | ICD-10-CM

## 2019-04-11 DIAGNOSIS — S52.202A FOREARM FRACTURES, BOTH BONES, CLOSED, LEFT, INITIAL ENCOUNTER: Primary | ICD-10-CM

## 2019-04-11 PROCEDURE — 97110 THERAPEUTIC EXERCISES: CPT | Performed by: OCCUPATIONAL THERAPIST

## 2019-04-11 PROCEDURE — 97162 PT EVAL MOD COMPLEX 30 MIN: CPT

## 2019-04-11 PROCEDURE — 97140 MANUAL THERAPY 1/> REGIONS: CPT | Performed by: OCCUPATIONAL THERAPIST

## 2019-04-15 ENCOUNTER — OFFICE VISIT (OUTPATIENT)
Dept: OCCUPATIONAL THERAPY | Facility: CLINIC | Age: 24
End: 2019-04-15
Payer: COMMERCIAL

## 2019-04-15 ENCOUNTER — OFFICE VISIT (OUTPATIENT)
Dept: PHYSICAL THERAPY | Facility: CLINIC | Age: 24
End: 2019-04-15
Payer: COMMERCIAL

## 2019-04-15 ENCOUNTER — TELEPHONE (OUTPATIENT)
Dept: NEUROLOGY | Facility: CLINIC | Age: 24
End: 2019-04-15

## 2019-04-15 DIAGNOSIS — S52.202A FOREARM FRACTURES, BOTH BONES, CLOSED, LEFT, INITIAL ENCOUNTER: Primary | ICD-10-CM

## 2019-04-15 DIAGNOSIS — M43.6 ACUTE MUSCLE STIFFNESS OF NECK: ICD-10-CM

## 2019-04-15 DIAGNOSIS — S52.92XA FOREARM FRACTURES, BOTH BONES, CLOSED, LEFT, INITIAL ENCOUNTER: Primary | ICD-10-CM

## 2019-04-15 DIAGNOSIS — M54.2 NECK PAIN: Primary | ICD-10-CM

## 2019-04-15 PROCEDURE — 97110 THERAPEUTIC EXERCISES: CPT | Performed by: OCCUPATIONAL THERAPIST

## 2019-04-15 PROCEDURE — 97140 MANUAL THERAPY 1/> REGIONS: CPT | Performed by: PHYSICAL THERAPIST

## 2019-04-15 PROCEDURE — 97110 THERAPEUTIC EXERCISES: CPT | Performed by: PHYSICAL THERAPIST

## 2019-04-15 PROCEDURE — 97140 MANUAL THERAPY 1/> REGIONS: CPT | Performed by: OCCUPATIONAL THERAPIST

## 2019-04-16 ENCOUNTER — OFFICE VISIT (OUTPATIENT)
Dept: OBGYN CLINIC | Facility: CLINIC | Age: 24
End: 2019-04-16
Payer: COMMERCIAL

## 2019-04-16 VITALS
BODY MASS INDEX: 20.09 KG/M2 | HEIGHT: 66 IN | WEIGHT: 125 LBS | SYSTOLIC BLOOD PRESSURE: 119 MMHG | DIASTOLIC BLOOD PRESSURE: 72 MMHG

## 2019-04-16 DIAGNOSIS — S06.0X1D CONCUSSION WITH LOSS OF CONSCIOUSNESS OF 30 MINUTES OR LESS, SUBSEQUENT ENCOUNTER: Primary | ICD-10-CM

## 2019-04-16 DIAGNOSIS — G44.311 INTRACTABLE ACUTE POST-TRAUMATIC HEADACHE: ICD-10-CM

## 2019-04-16 PROCEDURE — 99213 OFFICE O/P EST LOW 20 MIN: CPT | Performed by: FAMILY MEDICINE

## 2019-04-18 ENCOUNTER — OFFICE VISIT (OUTPATIENT)
Dept: OCCUPATIONAL THERAPY | Facility: CLINIC | Age: 24
End: 2019-04-18
Payer: COMMERCIAL

## 2019-04-18 ENCOUNTER — OFFICE VISIT (OUTPATIENT)
Dept: PHYSICAL THERAPY | Facility: CLINIC | Age: 24
End: 2019-04-18
Payer: COMMERCIAL

## 2019-04-18 DIAGNOSIS — M54.2 NECK PAIN: Primary | ICD-10-CM

## 2019-04-18 DIAGNOSIS — S52.202A FOREARM FRACTURES, BOTH BONES, CLOSED, LEFT, INITIAL ENCOUNTER: Primary | ICD-10-CM

## 2019-04-18 DIAGNOSIS — S52.92XA FOREARM FRACTURES, BOTH BONES, CLOSED, LEFT, INITIAL ENCOUNTER: Primary | ICD-10-CM

## 2019-04-18 DIAGNOSIS — M43.6 ACUTE MUSCLE STIFFNESS OF NECK: ICD-10-CM

## 2019-04-18 PROCEDURE — 97110 THERAPEUTIC EXERCISES: CPT | Performed by: OCCUPATIONAL THERAPIST

## 2019-04-18 PROCEDURE — 97140 MANUAL THERAPY 1/> REGIONS: CPT | Performed by: OCCUPATIONAL THERAPIST

## 2019-04-18 PROCEDURE — 97110 THERAPEUTIC EXERCISES: CPT | Performed by: PHYSICAL THERAPIST

## 2019-04-18 PROCEDURE — 97140 MANUAL THERAPY 1/> REGIONS: CPT | Performed by: PHYSICAL THERAPIST

## 2019-04-19 ENCOUNTER — TELEPHONE (OUTPATIENT)
Dept: OBGYN CLINIC | Facility: HOSPITAL | Age: 24
End: 2019-04-19

## 2019-04-22 ENCOUNTER — OFFICE VISIT (OUTPATIENT)
Dept: OCCUPATIONAL THERAPY | Facility: CLINIC | Age: 24
End: 2019-04-22
Payer: COMMERCIAL

## 2019-04-22 ENCOUNTER — OFFICE VISIT (OUTPATIENT)
Dept: PHYSICAL THERAPY | Facility: CLINIC | Age: 24
End: 2019-04-22
Payer: COMMERCIAL

## 2019-04-22 DIAGNOSIS — S52.92XA FOREARM FRACTURES, BOTH BONES, CLOSED, LEFT, INITIAL ENCOUNTER: Primary | ICD-10-CM

## 2019-04-22 DIAGNOSIS — S52.202A FOREARM FRACTURES, BOTH BONES, CLOSED, LEFT, INITIAL ENCOUNTER: Primary | ICD-10-CM

## 2019-04-22 DIAGNOSIS — M43.6 ACUTE MUSCLE STIFFNESS OF NECK: ICD-10-CM

## 2019-04-22 DIAGNOSIS — M54.2 NECK PAIN: Primary | ICD-10-CM

## 2019-04-22 PROCEDURE — 97110 THERAPEUTIC EXERCISES: CPT | Performed by: OCCUPATIONAL THERAPIST

## 2019-04-22 PROCEDURE — 97110 THERAPEUTIC EXERCISES: CPT | Performed by: PHYSICAL THERAPIST

## 2019-04-22 PROCEDURE — 97140 MANUAL THERAPY 1/> REGIONS: CPT | Performed by: OCCUPATIONAL THERAPIST

## 2019-04-22 PROCEDURE — 97140 MANUAL THERAPY 1/> REGIONS: CPT | Performed by: PHYSICAL THERAPIST

## 2019-04-25 ENCOUNTER — OFFICE VISIT (OUTPATIENT)
Dept: PHYSICAL THERAPY | Facility: CLINIC | Age: 24
End: 2019-04-25
Payer: COMMERCIAL

## 2019-04-25 ENCOUNTER — OFFICE VISIT (OUTPATIENT)
Dept: OCCUPATIONAL THERAPY | Facility: CLINIC | Age: 24
End: 2019-04-25
Payer: COMMERCIAL

## 2019-04-25 DIAGNOSIS — S52.92XA FOREARM FRACTURES, BOTH BONES, CLOSED, LEFT, INITIAL ENCOUNTER: Primary | ICD-10-CM

## 2019-04-25 DIAGNOSIS — M54.2 NECK PAIN: Primary | ICD-10-CM

## 2019-04-25 DIAGNOSIS — M43.6 ACUTE MUSCLE STIFFNESS OF NECK: ICD-10-CM

## 2019-04-25 DIAGNOSIS — S52.202A FOREARM FRACTURES, BOTH BONES, CLOSED, LEFT, INITIAL ENCOUNTER: Primary | ICD-10-CM

## 2019-04-25 PROCEDURE — 97022 WHIRLPOOL THERAPY: CPT | Performed by: OCCUPATIONAL THERAPIST

## 2019-04-25 PROCEDURE — 97110 THERAPEUTIC EXERCISES: CPT | Performed by: OCCUPATIONAL THERAPIST

## 2019-04-25 PROCEDURE — 97140 MANUAL THERAPY 1/> REGIONS: CPT | Performed by: OCCUPATIONAL THERAPIST

## 2019-04-25 PROCEDURE — 97140 MANUAL THERAPY 1/> REGIONS: CPT

## 2019-04-29 ENCOUNTER — OFFICE VISIT (OUTPATIENT)
Dept: PHYSICAL THERAPY | Facility: CLINIC | Age: 24
End: 2019-04-29
Payer: COMMERCIAL

## 2019-04-29 ENCOUNTER — OFFICE VISIT (OUTPATIENT)
Dept: OCCUPATIONAL THERAPY | Facility: CLINIC | Age: 24
End: 2019-04-29
Payer: COMMERCIAL

## 2019-04-29 DIAGNOSIS — M43.6 ACUTE MUSCLE STIFFNESS OF NECK: ICD-10-CM

## 2019-04-29 DIAGNOSIS — M54.2 NECK PAIN: Primary | ICD-10-CM

## 2019-04-29 DIAGNOSIS — S52.202A FOREARM FRACTURES, BOTH BONES, CLOSED, LEFT, INITIAL ENCOUNTER: Primary | ICD-10-CM

## 2019-04-29 DIAGNOSIS — S52.92XA FOREARM FRACTURES, BOTH BONES, CLOSED, LEFT, INITIAL ENCOUNTER: Primary | ICD-10-CM

## 2019-04-29 PROCEDURE — 97140 MANUAL THERAPY 1/> REGIONS: CPT | Performed by: OCCUPATIONAL THERAPIST

## 2019-04-29 PROCEDURE — 97110 THERAPEUTIC EXERCISES: CPT | Performed by: OCCUPATIONAL THERAPIST

## 2019-04-29 PROCEDURE — 97140 MANUAL THERAPY 1/> REGIONS: CPT

## 2019-04-29 PROCEDURE — 97110 THERAPEUTIC EXERCISES: CPT

## 2019-05-02 ENCOUNTER — OFFICE VISIT (OUTPATIENT)
Dept: PHYSICAL THERAPY | Facility: CLINIC | Age: 24
End: 2019-05-02
Payer: COMMERCIAL

## 2019-05-02 ENCOUNTER — OFFICE VISIT (OUTPATIENT)
Dept: OCCUPATIONAL THERAPY | Facility: CLINIC | Age: 24
End: 2019-05-02
Payer: COMMERCIAL

## 2019-05-02 DIAGNOSIS — M43.6 ACUTE MUSCLE STIFFNESS OF NECK: ICD-10-CM

## 2019-05-02 DIAGNOSIS — M54.2 NECK PAIN: Primary | ICD-10-CM

## 2019-05-02 DIAGNOSIS — S52.202A FOREARM FRACTURES, BOTH BONES, CLOSED, LEFT, INITIAL ENCOUNTER: Primary | ICD-10-CM

## 2019-05-02 DIAGNOSIS — S52.92XA FOREARM FRACTURES, BOTH BONES, CLOSED, LEFT, INITIAL ENCOUNTER: Primary | ICD-10-CM

## 2019-05-02 PROCEDURE — 97140 MANUAL THERAPY 1/> REGIONS: CPT | Performed by: OCCUPATIONAL THERAPIST

## 2019-05-02 PROCEDURE — 97110 THERAPEUTIC EXERCISES: CPT

## 2019-05-02 PROCEDURE — 97022 WHIRLPOOL THERAPY: CPT | Performed by: OCCUPATIONAL THERAPIST

## 2019-05-02 PROCEDURE — 97140 MANUAL THERAPY 1/> REGIONS: CPT

## 2019-05-02 PROCEDURE — 97110 THERAPEUTIC EXERCISES: CPT | Performed by: OCCUPATIONAL THERAPIST

## 2019-05-03 ENCOUNTER — TELEPHONE (OUTPATIENT)
Dept: FAMILY MEDICINE CLINIC | Facility: CLINIC | Age: 24
End: 2019-05-03

## 2019-05-06 ENCOUNTER — OFFICE VISIT (OUTPATIENT)
Dept: OCCUPATIONAL THERAPY | Facility: CLINIC | Age: 24
End: 2019-05-06
Payer: COMMERCIAL

## 2019-05-06 ENCOUNTER — OFFICE VISIT (OUTPATIENT)
Dept: FAMILY MEDICINE CLINIC | Facility: CLINIC | Age: 24
End: 2019-05-06
Payer: COMMERCIAL

## 2019-05-06 ENCOUNTER — TRANSCRIBE ORDERS (OUTPATIENT)
Dept: PHYSICAL THERAPY | Facility: CLINIC | Age: 24
End: 2019-05-06

## 2019-05-06 ENCOUNTER — OFFICE VISIT (OUTPATIENT)
Dept: PHYSICAL THERAPY | Facility: CLINIC | Age: 24
End: 2019-05-06
Payer: COMMERCIAL

## 2019-05-06 VITALS
HEART RATE: 76 BPM | SYSTOLIC BLOOD PRESSURE: 110 MMHG | WEIGHT: 121 LBS | HEIGHT: 66 IN | RESPIRATION RATE: 14 BRPM | BODY MASS INDEX: 19.44 KG/M2 | DIASTOLIC BLOOD PRESSURE: 60 MMHG

## 2019-05-06 DIAGNOSIS — S52.202A FOREARM FRACTURES, BOTH BONES, CLOSED, LEFT, INITIAL ENCOUNTER: Primary | ICD-10-CM

## 2019-05-06 DIAGNOSIS — H53.8 BLURRY VISION: ICD-10-CM

## 2019-05-06 DIAGNOSIS — G47.00 INSOMNIA, UNSPECIFIED TYPE: ICD-10-CM

## 2019-05-06 DIAGNOSIS — R41.3 MEMORY LOSS: ICD-10-CM

## 2019-05-06 DIAGNOSIS — S52.92XD CLOSED FRACTURE OF LEFT FOREARM WITH ROUTINE HEALING: Primary | ICD-10-CM

## 2019-05-06 DIAGNOSIS — M54.2 NECK PAIN: Primary | ICD-10-CM

## 2019-05-06 DIAGNOSIS — R51.9 NONINTRACTABLE HEADACHE, UNSPECIFIED CHRONICITY PATTERN, UNSPECIFIED HEADACHE TYPE: ICD-10-CM

## 2019-05-06 DIAGNOSIS — S52.92XA FOREARM FRACTURES, BOTH BONES, CLOSED, LEFT, INITIAL ENCOUNTER: Primary | ICD-10-CM

## 2019-05-06 DIAGNOSIS — M43.6 ACUTE MUSCLE STIFFNESS OF NECK: ICD-10-CM

## 2019-05-06 DIAGNOSIS — S06.0X1D CONCUSSION WITH LOSS OF CONSCIOUSNESS OF 30 MINUTES OR LESS, SUBSEQUENT ENCOUNTER: Primary | ICD-10-CM

## 2019-05-06 PROBLEM — G44.311 INTRACTABLE ACUTE POST-TRAUMATIC HEADACHE: Status: RESOLVED | Noted: 2019-03-29 | Resolved: 2019-05-06

## 2019-05-06 PROCEDURE — 97140 MANUAL THERAPY 1/> REGIONS: CPT

## 2019-05-06 PROCEDURE — 99213 OFFICE O/P EST LOW 20 MIN: CPT | Performed by: PHYSICIAN ASSISTANT

## 2019-05-06 PROCEDURE — 97110 THERAPEUTIC EXERCISES: CPT | Performed by: OCCUPATIONAL THERAPIST

## 2019-05-06 PROCEDURE — 97140 MANUAL THERAPY 1/> REGIONS: CPT | Performed by: OCCUPATIONAL THERAPIST

## 2019-05-06 PROCEDURE — 97110 THERAPEUTIC EXERCISES: CPT

## 2019-05-09 ENCOUNTER — EVALUATION (OUTPATIENT)
Dept: OCCUPATIONAL THERAPY | Facility: REHABILITATION | Age: 24
End: 2019-05-09
Payer: COMMERCIAL

## 2019-05-09 ENCOUNTER — EVALUATION (OUTPATIENT)
Dept: PHYSICAL THERAPY | Facility: CLINIC | Age: 24
End: 2019-05-09
Payer: COMMERCIAL

## 2019-05-09 ENCOUNTER — OFFICE VISIT (OUTPATIENT)
Dept: OCCUPATIONAL THERAPY | Facility: CLINIC | Age: 24
End: 2019-05-09
Payer: COMMERCIAL

## 2019-05-09 DIAGNOSIS — S52.92XA FOREARM FRACTURES, BOTH BONES, CLOSED, LEFT, INITIAL ENCOUNTER: Primary | ICD-10-CM

## 2019-05-09 DIAGNOSIS — M54.2 NECK PAIN: Primary | ICD-10-CM

## 2019-05-09 DIAGNOSIS — H53.8 BLURRY VISION: ICD-10-CM

## 2019-05-09 DIAGNOSIS — R41.3 MEMORY LOSS: Primary | ICD-10-CM

## 2019-05-09 DIAGNOSIS — M43.6 ACUTE MUSCLE STIFFNESS OF NECK: ICD-10-CM

## 2019-05-09 DIAGNOSIS — S52.202A FOREARM FRACTURES, BOTH BONES, CLOSED, LEFT, INITIAL ENCOUNTER: Primary | ICD-10-CM

## 2019-05-09 PROCEDURE — 97110 THERAPEUTIC EXERCISES: CPT | Performed by: PHYSICAL THERAPIST

## 2019-05-09 PROCEDURE — 97022 WHIRLPOOL THERAPY: CPT | Performed by: OCCUPATIONAL THERAPIST

## 2019-05-09 PROCEDURE — 97165 OT EVAL LOW COMPLEX 30 MIN: CPT | Performed by: OCCUPATIONAL THERAPIST

## 2019-05-09 PROCEDURE — 97110 THERAPEUTIC EXERCISES: CPT | Performed by: OCCUPATIONAL THERAPIST

## 2019-05-09 PROCEDURE — 97140 MANUAL THERAPY 1/> REGIONS: CPT | Performed by: OCCUPATIONAL THERAPIST

## 2019-05-10 ENCOUNTER — OFFICE VISIT (OUTPATIENT)
Dept: NEUROLOGY | Facility: CLINIC | Age: 24
End: 2019-05-10
Payer: COMMERCIAL

## 2019-05-10 VITALS — DIASTOLIC BLOOD PRESSURE: 74 MMHG | SYSTOLIC BLOOD PRESSURE: 110 MMHG | HEART RATE: 78 BPM | RESPIRATION RATE: 16 BRPM

## 2019-05-10 DIAGNOSIS — S06.0X9D CONCUSSION WITH LOSS OF CONSCIOUSNESS, SUBSEQUENT ENCOUNTER: ICD-10-CM

## 2019-05-10 DIAGNOSIS — S06.361A TRAUMATIC HEMORRHAGE OF CEREBRUM WITH LOSS OF CONSCIOUSNESS OF 30 MINUTES OR LESS, UNSPECIFIED LATERALITY, INITIAL ENCOUNTER (CMS/HCC): Primary | ICD-10-CM

## 2019-05-10 PROBLEM — S06.0X9A CONCUSSION WITH LOSS OF CONSCIOUSNESS: Status: ACTIVE | Noted: 2019-05-10

## 2019-05-10 PROCEDURE — 99204 OFFICE O/P NEW MOD 45 MIN: CPT | Performed by: PSYCHIATRY & NEUROLOGY

## 2019-05-10 RX ORDER — LEVALBUTEROL TARTRATE 45 UG/1
1-2 AEROSOL, METERED ORAL EVERY 6 HOURS PRN
COMMUNITY
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
## 2019-05-10 NOTE — ASSESSMENT & PLAN NOTE
The patient had a significant concussion.  We discussed the diagnosis, pathophysiology, treatment and prognosis of her condition.  She has made significant strides and is close to baseline.  Her biggest complaints at this time are cognitive dysfunction, difficulty focusing, difficulty sleeping and her left arm.  She is certainly free to push herself both physically and cognitively although while symptomatic she absolutely needs to avoid another head injury.  If her symptoms return, it is her body's way of telling her that she remain symptomatic however she will not be causing any brain damage or delaying her ultimate recovery in any way.    I always recommend that patients try and get back to their previous life as soon as possible.  I think it is great that she is back to work and she should advance her hours as tolerated.  Certainly I think is reasonable for her to attend physical therapy, visual/vestibular therapy etc. with the knowledge that her condition should continue to improve and she should be back to her baseline in the coming weeks to months.  In the future additional diagnostics and treatments will depend on her clinical course.

## 2019-05-13 ENCOUNTER — APPOINTMENT (OUTPATIENT)
Dept: PHYSICAL THERAPY | Facility: CLINIC | Age: 24
End: 2019-05-13
Payer: COMMERCIAL

## 2019-05-13 ENCOUNTER — OFFICE VISIT (OUTPATIENT)
Dept: OCCUPATIONAL THERAPY | Facility: CLINIC | Age: 24
End: 2019-05-13
Payer: COMMERCIAL

## 2019-05-13 ENCOUNTER — OFFICE VISIT (OUTPATIENT)
Dept: OCCUPATIONAL THERAPY | Facility: REHABILITATION | Age: 24
End: 2019-05-13
Payer: COMMERCIAL

## 2019-05-13 DIAGNOSIS — S52.202A FOREARM FRACTURES, BOTH BONES, CLOSED, LEFT, INITIAL ENCOUNTER: Primary | ICD-10-CM

## 2019-05-13 DIAGNOSIS — R41.3 MEMORY LOSS: ICD-10-CM

## 2019-05-13 DIAGNOSIS — S52.92XA FOREARM FRACTURES, BOTH BONES, CLOSED, LEFT, INITIAL ENCOUNTER: Primary | ICD-10-CM

## 2019-05-13 DIAGNOSIS — H53.8 BLURRY VISION: Primary | ICD-10-CM

## 2019-05-13 PROCEDURE — 97535 SELF CARE MNGMENT TRAINING: CPT | Performed by: OCCUPATIONAL THERAPIST

## 2019-05-13 PROCEDURE — 97530 THERAPEUTIC ACTIVITIES: CPT | Performed by: OCCUPATIONAL THERAPIST

## 2019-05-13 PROCEDURE — 97140 MANUAL THERAPY 1/> REGIONS: CPT | Performed by: OCCUPATIONAL THERAPIST

## 2019-05-13 PROCEDURE — 97110 THERAPEUTIC EXERCISES: CPT | Performed by: OCCUPATIONAL THERAPIST

## 2019-05-16 ENCOUNTER — APPOINTMENT (OUTPATIENT)
Dept: PHYSICAL THERAPY | Facility: CLINIC | Age: 24
End: 2019-05-16
Payer: COMMERCIAL

## 2019-05-16 ENCOUNTER — OFFICE VISIT (OUTPATIENT)
Dept: OCCUPATIONAL THERAPY | Facility: CLINIC | Age: 24
End: 2019-05-16
Payer: COMMERCIAL

## 2019-05-16 ENCOUNTER — OFFICE VISIT (OUTPATIENT)
Dept: OCCUPATIONAL THERAPY | Facility: REHABILITATION | Age: 24
End: 2019-05-16
Payer: COMMERCIAL

## 2019-05-16 DIAGNOSIS — S52.92XA FOREARM FRACTURES, BOTH BONES, CLOSED, LEFT, INITIAL ENCOUNTER: Primary | ICD-10-CM

## 2019-05-16 DIAGNOSIS — S52.202A FOREARM FRACTURES, BOTH BONES, CLOSED, LEFT, INITIAL ENCOUNTER: Primary | ICD-10-CM

## 2019-05-16 DIAGNOSIS — R41.3 MEMORY LOSS: ICD-10-CM

## 2019-05-16 DIAGNOSIS — H53.8 BLURRY VISION: Primary | ICD-10-CM

## 2019-05-16 PROCEDURE — 97110 THERAPEUTIC EXERCISES: CPT | Performed by: OCCUPATIONAL THERAPIST

## 2019-05-16 PROCEDURE — 97140 MANUAL THERAPY 1/> REGIONS: CPT | Performed by: OCCUPATIONAL THERAPIST

## 2019-05-16 PROCEDURE — 97535 SELF CARE MNGMENT TRAINING: CPT | Performed by: OCCUPATIONAL THERAPIST

## 2019-05-20 ENCOUNTER — APPOINTMENT (OUTPATIENT)
Dept: OCCUPATIONAL THERAPY | Facility: CLINIC | Age: 24
End: 2019-05-20
Payer: COMMERCIAL

## 2019-05-21 ENCOUNTER — OFFICE VISIT (OUTPATIENT)
Dept: OCCUPATIONAL THERAPY | Facility: REHABILITATION | Age: 24
End: 2019-05-21
Payer: COMMERCIAL

## 2019-05-21 ENCOUNTER — OFFICE VISIT (OUTPATIENT)
Dept: OCCUPATIONAL THERAPY | Facility: CLINIC | Age: 24
End: 2019-05-21
Payer: COMMERCIAL

## 2019-05-21 DIAGNOSIS — S52.92XA FOREARM FRACTURES, BOTH BONES, CLOSED, LEFT, INITIAL ENCOUNTER: Primary | ICD-10-CM

## 2019-05-21 DIAGNOSIS — H53.8 BLURRY VISION: ICD-10-CM

## 2019-05-21 DIAGNOSIS — S52.202A FOREARM FRACTURES, BOTH BONES, CLOSED, LEFT, INITIAL ENCOUNTER: Primary | ICD-10-CM

## 2019-05-21 PROCEDURE — 97140 MANUAL THERAPY 1/> REGIONS: CPT | Performed by: OCCUPATIONAL THERAPIST

## 2019-05-21 PROCEDURE — 97535 SELF CARE MNGMENT TRAINING: CPT

## 2019-05-21 PROCEDURE — 97022 WHIRLPOOL THERAPY: CPT | Performed by: OCCUPATIONAL THERAPIST

## 2019-05-21 PROCEDURE — 97110 THERAPEUTIC EXERCISES: CPT | Performed by: OCCUPATIONAL THERAPIST

## 2019-05-21 PROCEDURE — 97150 GROUP THERAPEUTIC PROCEDURES: CPT

## 2019-05-23 ENCOUNTER — APPOINTMENT (OUTPATIENT)
Dept: OCCUPATIONAL THERAPY | Facility: REHABILITATION | Age: 24
End: 2019-05-23
Payer: COMMERCIAL

## 2019-05-28 ENCOUNTER — OFFICE VISIT (OUTPATIENT)
Dept: OCCUPATIONAL THERAPY | Facility: CLINIC | Age: 24
End: 2019-05-28
Payer: COMMERCIAL

## 2019-05-28 ENCOUNTER — OFFICE VISIT (OUTPATIENT)
Dept: OCCUPATIONAL THERAPY | Facility: REHABILITATION | Age: 24
End: 2019-05-28
Payer: COMMERCIAL

## 2019-05-28 DIAGNOSIS — S52.92XA FOREARM FRACTURES, BOTH BONES, CLOSED, LEFT, INITIAL ENCOUNTER: Primary | ICD-10-CM

## 2019-05-28 DIAGNOSIS — S52.202A FOREARM FRACTURES, BOTH BONES, CLOSED, LEFT, INITIAL ENCOUNTER: Primary | ICD-10-CM

## 2019-05-28 DIAGNOSIS — H53.8 BLURRY VISION: Primary | ICD-10-CM

## 2019-05-28 PROCEDURE — 97530 THERAPEUTIC ACTIVITIES: CPT | Performed by: OCCUPATIONAL THERAPIST

## 2019-05-28 PROCEDURE — 97535 SELF CARE MNGMENT TRAINING: CPT

## 2019-05-28 PROCEDURE — 97140 MANUAL THERAPY 1/> REGIONS: CPT | Performed by: OCCUPATIONAL THERAPIST

## 2019-05-28 PROCEDURE — 97110 THERAPEUTIC EXERCISES: CPT | Performed by: OCCUPATIONAL THERAPIST

## 2019-05-30 ENCOUNTER — APPOINTMENT (OUTPATIENT)
Dept: OCCUPATIONAL THERAPY | Facility: CLINIC | Age: 24
End: 2019-05-30
Payer: COMMERCIAL

## 2019-05-30 ENCOUNTER — APPOINTMENT (OUTPATIENT)
Dept: OCCUPATIONAL THERAPY | Facility: REHABILITATION | Age: 24
End: 2019-05-30
Payer: COMMERCIAL

## 2019-05-31 ENCOUNTER — TRANSCRIBE ORDERS (OUTPATIENT)
Dept: ADMINISTRATIVE | Facility: HOSPITAL | Age: 24
End: 2019-05-31

## 2019-05-31 DIAGNOSIS — S42.309B: Primary | ICD-10-CM

## 2019-06-03 ENCOUNTER — APPOINTMENT (OUTPATIENT)
Dept: OCCUPATIONAL THERAPY | Facility: CLINIC | Age: 24
End: 2019-06-03
Payer: COMMERCIAL

## 2019-06-05 ENCOUNTER — HOSPITAL ENCOUNTER (OUTPATIENT)
Dept: NEUROLOGY | Facility: AMBULATORY SURGERY CENTER | Age: 24
Discharge: HOME | End: 2019-06-05
Payer: COMMERCIAL

## 2019-06-05 ENCOUNTER — TRANSCRIBE ORDERS (OUTPATIENT)
Dept: ADMINISTRATIVE | Facility: HOSPITAL | Age: 24
End: 2019-06-05

## 2019-06-05 ENCOUNTER — TELEPHONE (OUTPATIENT)
Dept: OBGYN CLINIC | Facility: CLINIC | Age: 24
End: 2019-06-05

## 2019-06-05 ENCOUNTER — OFFICE VISIT (OUTPATIENT)
Dept: OCCUPATIONAL THERAPY | Facility: CLINIC | Age: 24
End: 2019-06-05
Payer: COMMERCIAL

## 2019-06-05 DIAGNOSIS — S42.309B: ICD-10-CM

## 2019-06-05 DIAGNOSIS — S52.92XA FOREARM FRACTURES, BOTH BONES, CLOSED, LEFT, INITIAL ENCOUNTER: Primary | ICD-10-CM

## 2019-06-05 DIAGNOSIS — S52.202A FOREARM FRACTURES, BOTH BONES, CLOSED, LEFT, INITIAL ENCOUNTER: Primary | ICD-10-CM

## 2019-06-05 DIAGNOSIS — G56.82: Primary | ICD-10-CM

## 2019-06-05 PROCEDURE — 97530 THERAPEUTIC ACTIVITIES: CPT | Performed by: OCCUPATIONAL THERAPIST

## 2019-06-05 PROCEDURE — 95886 MUSC TEST DONE W/N TEST COMP: CPT | Performed by: PSYCHIATRY & NEUROLOGY

## 2019-06-05 PROCEDURE — 97010 HOT OR COLD PACKS THERAPY: CPT | Performed by: OCCUPATIONAL THERAPIST

## 2019-06-05 PROCEDURE — 97140 MANUAL THERAPY 1/> REGIONS: CPT | Performed by: OCCUPATIONAL THERAPIST

## 2019-06-05 PROCEDURE — 95911 NRV CNDJ TEST 9-10 STUDIES: CPT | Performed by: PSYCHIATRY & NEUROLOGY

## 2019-06-05 PROCEDURE — 97110 THERAPEUTIC EXERCISES: CPT | Performed by: OCCUPATIONAL THERAPIST

## 2019-06-05 NOTE — TELEPHONE ENCOUNTER
6/5/2019 MOM JAYCEE OF PT STOPPED BY SINCE THEY WERE IN AREA FOR OT AND WAS WONDERING WHAT UPDATE WAS FOR WHETHER OR NOT DR DAWSON WAS WILLING TO SEE PT  PLS CALL PT [facility] [phone] WHEN DECISION WAS MADE

## 2019-06-06 ENCOUNTER — APPOINTMENT (OUTPATIENT)
Dept: OCCUPATIONAL THERAPY | Facility: CLINIC | Age: 24
End: 2019-06-06
Payer: COMMERCIAL

## 2019-06-10 ENCOUNTER — OFFICE VISIT (OUTPATIENT)
Dept: OBGYN CLINIC | Facility: CLINIC | Age: 24
End: 2019-06-10
Payer: COMMERCIAL

## 2019-06-10 ENCOUNTER — OFFICE VISIT (OUTPATIENT)
Dept: OCCUPATIONAL THERAPY | Facility: CLINIC | Age: 24
End: 2019-06-10
Payer: COMMERCIAL

## 2019-06-10 ENCOUNTER — APPOINTMENT (OUTPATIENT)
Dept: OCCUPATIONAL THERAPY | Facility: CLINIC | Age: 24
End: 2019-06-10
Payer: COMMERCIAL

## 2019-06-10 ENCOUNTER — APPOINTMENT (OUTPATIENT)
Dept: RADIOLOGY | Facility: CLINIC | Age: 24
End: 2019-06-10
Payer: COMMERCIAL

## 2019-06-10 VITALS
BODY MASS INDEX: 19.73 KG/M2 | HEIGHT: 66 IN | SYSTOLIC BLOOD PRESSURE: 110 MMHG | DIASTOLIC BLOOD PRESSURE: 70 MMHG | HEART RATE: 70 BPM | WEIGHT: 122.8 LBS

## 2019-06-10 DIAGNOSIS — Z87.81 S/P ORIF (OPEN REDUCTION INTERNAL FIXATION) FRACTURE: ICD-10-CM

## 2019-06-10 DIAGNOSIS — S52.202A FOREARM FRACTURES, BOTH BONES, CLOSED, LEFT, INITIAL ENCOUNTER: Primary | ICD-10-CM

## 2019-06-10 DIAGNOSIS — Z98.890 S/P ORIF (OPEN REDUCTION INTERNAL FIXATION) FRACTURE: ICD-10-CM

## 2019-06-10 DIAGNOSIS — S52.92XA FOREARM FRACTURES, BOTH BONES, CLOSED, LEFT, INITIAL ENCOUNTER: Primary | ICD-10-CM

## 2019-06-10 DIAGNOSIS — Z98.890 S/P ORIF (OPEN REDUCTION INTERNAL FIXATION) FRACTURE: Primary | ICD-10-CM

## 2019-06-10 DIAGNOSIS — G56.32 RADIAL NERVE DYSFUNCTION, LEFT: ICD-10-CM

## 2019-06-10 DIAGNOSIS — S54.8X2A: ICD-10-CM

## 2019-06-10 DIAGNOSIS — Z87.81 S/P ORIF (OPEN REDUCTION INTERNAL FIXATION) FRACTURE: Primary | ICD-10-CM

## 2019-06-10 PROCEDURE — 99213 OFFICE O/P EST LOW 20 MIN: CPT | Performed by: ORTHOPAEDIC SURGERY

## 2019-06-10 PROCEDURE — 97110 THERAPEUTIC EXERCISES: CPT | Performed by: OCCUPATIONAL THERAPIST

## 2019-06-10 PROCEDURE — 73090 X-RAY EXAM OF FOREARM: CPT

## 2019-06-10 PROCEDURE — 97530 THERAPEUTIC ACTIVITIES: CPT | Performed by: OCCUPATIONAL THERAPIST

## 2019-06-17 ENCOUNTER — OFFICE VISIT (OUTPATIENT)
Dept: OCCUPATIONAL THERAPY | Facility: CLINIC | Age: 24
End: 2019-06-17
Payer: COMMERCIAL

## 2019-06-17 DIAGNOSIS — S52.202A FOREARM FRACTURES, BOTH BONES, CLOSED, LEFT, INITIAL ENCOUNTER: Primary | ICD-10-CM

## 2019-06-17 DIAGNOSIS — S52.92XA FOREARM FRACTURES, BOTH BONES, CLOSED, LEFT, INITIAL ENCOUNTER: Primary | ICD-10-CM

## 2019-06-17 PROCEDURE — 97110 THERAPEUTIC EXERCISES: CPT | Performed by: OCCUPATIONAL THERAPIST

## 2019-06-17 PROCEDURE — 97530 THERAPEUTIC ACTIVITIES: CPT | Performed by: OCCUPATIONAL THERAPIST

## 2019-06-19 ENCOUNTER — APPOINTMENT (OUTPATIENT)
Dept: OCCUPATIONAL THERAPY | Facility: CLINIC | Age: 24
End: 2019-06-19
Payer: COMMERCIAL

## 2019-06-20 NOTE — ED NOTES
L arm reduction completed and posterior splint placed by Dr. Goodman (Ortho) . xrays ordered. Xray at bedside     Lori Pelayo RN  03/22/19 [id number]     Patient requesting back brace for scoliosis

## 2019-06-24 ENCOUNTER — OFFICE VISIT (OUTPATIENT)
Dept: OCCUPATIONAL THERAPY | Facility: CLINIC | Age: 24
End: 2019-06-24
Payer: COMMERCIAL

## 2019-06-24 DIAGNOSIS — S52.92XA FOREARM FRACTURES, BOTH BONES, CLOSED, LEFT, INITIAL ENCOUNTER: Primary | ICD-10-CM

## 2019-06-24 DIAGNOSIS — S52.202A FOREARM FRACTURES, BOTH BONES, CLOSED, LEFT, INITIAL ENCOUNTER: Primary | ICD-10-CM

## 2019-06-24 PROCEDURE — 97530 THERAPEUTIC ACTIVITIES: CPT | Performed by: OCCUPATIONAL THERAPIST

## 2019-06-24 PROCEDURE — 97110 THERAPEUTIC EXERCISES: CPT | Performed by: OCCUPATIONAL THERAPIST

## 2019-10-09 ENCOUNTER — OFFICE VISIT (OUTPATIENT)
Dept: OBGYN CLINIC | Facility: HOSPITAL | Age: 24
End: 2019-10-09
Payer: COMMERCIAL

## 2019-10-09 ENCOUNTER — HOSPITAL ENCOUNTER (OUTPATIENT)
Dept: RADIOLOGY | Facility: HOSPITAL | Age: 24
Discharge: HOME | End: 2019-10-09
Attending: ORTHOPAEDIC SURGERY
Payer: COMMERCIAL

## 2019-10-09 VITALS
SYSTOLIC BLOOD PRESSURE: 119 MMHG | DIASTOLIC BLOOD PRESSURE: 74 MMHG | HEART RATE: 82 BPM | WEIGHT: 125.88 LBS | HEIGHT: 66 IN | BODY MASS INDEX: 20.23 KG/M2

## 2019-10-09 DIAGNOSIS — Z98.890 S/P ORIF (OPEN REDUCTION INTERNAL FIXATION) FRACTURE: ICD-10-CM

## 2019-10-09 DIAGNOSIS — Z87.81 S/P ORIF (OPEN REDUCTION INTERNAL FIXATION) FRACTURE: Primary | ICD-10-CM

## 2019-10-09 DIAGNOSIS — Z98.890 S/P ORIF (OPEN REDUCTION INTERNAL FIXATION) FRACTURE: Primary | ICD-10-CM

## 2019-10-09 DIAGNOSIS — Z87.81 S/P ORIF (OPEN REDUCTION INTERNAL FIXATION) FRACTURE: ICD-10-CM

## 2019-10-09 PROCEDURE — 99213 OFFICE O/P EST LOW 20 MIN: CPT | Performed by: ORTHOPAEDIC SURGERY

## 2019-10-09 PROCEDURE — 73090 X-RAY EXAM OF FOREARM: CPT

## 2019-10-09 NOTE — PROGRESS NOTES
ASSESSMENT/PLAN:    Assessment:   PIN of radial nerve palsy s/p left both bone forearm ORIF  (3/23/19)    Plan:   Nerve palsy is improving  Encouraged this should continue to improve  Continue with therapy exercises  Activities as tolerated    Follow Up:  PRN    _____________________________________________________  CHIEF COMPLAINT:  Chief Complaint   Patient presents with    Left Forearm - Follow-up         SUBJECTIVE:  Mendel Shan is a 21 y o  female who presents for follow up PIN of radial nerve palsy s/p left both bone forearm ORIF  (3/23/19)  Patient states she is doing much better  The only thing she notices is that she still has some slight weakness with finger extension, primarily the long finger  No other complaints at this time  PAST MEDICAL HISTORY:  No past medical history on file      PAST SURGICAL HISTORY:  Past Surgical History:   Procedure Laterality Date    WISDOM TOOTH EXTRACTION         FAMILY HISTORY:  Family History   Problem Relation Age of Onset    No Known Problems Father     Asthma Other     Mental illness Neg Hx     Substance Abuse Neg Hx     Alcohol abuse Neg Hx        SOCIAL HISTORY:  Social History     Tobacco Use    Smoking status: Never Smoker    Smokeless tobacco: Never Used   Substance Use Topics    Alcohol use: Yes     Comment: occassional 1-2 beers on the weekend     Drug use: No       MEDICATIONS:    Current Outpatient Medications:     acetaminophen (TYLENOL) 325 mg tablet, Take 975 mg by mouth, Disp: , Rfl:     albuterol (PROAIR HFA) 90 mcg/act inhaler, Inhale 2 puffs every 4 (four) hours as needed, Disp: , Rfl:     albuterol (PROVENTIL HFA,VENTOLIN HFA) 90 mcg/act inhaler, Inhale 2 puffs every 4 (four) hours, Disp: , Rfl:     fluticasone (FLONASE) 50 mcg/act nasal spray, , Disp: , Rfl:     Riboflavin (VITAMIN B-2) 100 MG TABS, Take 2 tablets (200 mg total) by mouth daily, Disp: 60 tablet, Rfl: 3    rizatriptan (MAXALT-MLT) 10 MG disintegrating tablet, Take 1 tab at migraine onset, and then can repeat once in 24 hours  Max 2 tabs in 24 hours  Max 2 days a week , Disp: 9 tablet, Rfl: 0    Magnesium 400 MG TABS, Take 1 tablet (400 mg total) by mouth daily for 30 days, Disp: 30 tablet, Rfl: 0    norethindrone-ethinyl estradiol (JUNEL 1/20) 1-20 MG-MCG per tablet, Take 1 tablet by mouth daily for 84 days, Disp: 84 tablet, Rfl: 3    ALLERGIES:  Allergies   Allergen Reactions    Other Allergic Rhinitis, Sneezing and Wheezing     Cat dander       REVIEW OF SYSTEMS:  Pertinent items are noted in HPI  A comprehensive review of systems was negative  LABS:  HgA1c: No results found for: HGBA1C  BMP:   Lab Results   Component Value Date    CALCIUM 8 4 10/22/2018    K 3 4 (L) 10/22/2018    CO2 26 10/22/2018     10/22/2018    BUN 7 10/22/2018    CREATININE 0 85 10/22/2018           _____________________________________________________  PHYSICAL EXAMINATION:  Vital signs: /74   Pulse 82   Ht 5' 6" (1 676 m)   Wt 57 1 kg (125 lb 14 1 oz)   BMI 20 32 kg/m²   General: well developed and well nourished, alert, oriented times 3 and appears comfortable  Psychiatric: Normal  HEENT: Trachea Midline, No torticollis  Cardiovascular: No discernable arrhythmia  Pulmonary: No wheezing or stridor  Skin: No masses, erythema, lacerations, fluctation, ulcerations  Neurovascular: Sensation Intact to the Median, Ulnar, Radial Nerve, Motor Intact to the Median, Ulnar, Radial Nerve and Pulses Intact    MUSCULOSKELETAL EXAMINATION:  LEFT SIDE:  Upper forearm with healed laceration with keloid formation  Patient has full flexion/extension and pronation/supination  AIN, APB, intrinsics, wrist flexiont and extension 5/5  She is able to extend all fingers, but there is a slight drag to the long finger compared to the rest and strength is approximately 4/5  Thumb retropulsion intact and is 4+/5  BR intact   Wrist extension is centralized  _____________________________________________________  STUDIES REVIEWED:  Images were reviewd in PACS: Xrays today show healed 3/4 cortices of the ulna and 4/4 of the radial shaft fxs with hardware intact        PROCEDURES PERFORMED:  Procedures  No Procedures performed today   Scribe Attestation    I,:    am acting as a scribe while in the presence of the attending physician :        I,:    personally performed the services described in this documentation    as scribed in my presence :

## 2019-11-06 ENCOUNTER — ANNUAL EXAM (OUTPATIENT)
Dept: OBGYN CLINIC | Facility: CLINIC | Age: 24
End: 2019-11-06
Payer: COMMERCIAL

## 2019-11-06 VITALS — SYSTOLIC BLOOD PRESSURE: 104 MMHG | DIASTOLIC BLOOD PRESSURE: 68 MMHG | BODY MASS INDEX: 20.66 KG/M2 | WEIGHT: 128 LBS

## 2019-11-06 DIAGNOSIS — A64 STD (FEMALE): ICD-10-CM

## 2019-11-06 DIAGNOSIS — Z30.011 ENCOUNTER FOR PRESCRIPTION OF ORAL CONTRACEPTIVES: ICD-10-CM

## 2019-11-06 DIAGNOSIS — Z01.419 ENCOUNTER FOR GYNECOLOGICAL EXAMINATION WITHOUT ABNORMAL FINDING: Primary | ICD-10-CM

## 2019-11-06 DIAGNOSIS — Z12.4 SCREENING FOR CERVICAL CANCER: ICD-10-CM

## 2019-11-06 PROCEDURE — 99395 PREV VISIT EST AGE 18-39: CPT | Performed by: OBSTETRICS & GYNECOLOGY

## 2019-11-06 PROCEDURE — 87624 HPV HI-RISK TYP POOLED RSLT: CPT | Performed by: OBSTETRICS & GYNECOLOGY

## 2019-11-06 PROCEDURE — 87491 CHLMYD TRACH DNA AMP PROBE: CPT | Performed by: OBSTETRICS & GYNECOLOGY

## 2019-11-06 PROCEDURE — 87591 N.GONORRHOEAE DNA AMP PROB: CPT | Performed by: OBSTETRICS & GYNECOLOGY

## 2019-11-06 PROCEDURE — G0124 SCREEN C/V THIN LAYER BY MD: HCPCS | Performed by: PATHOLOGY

## 2019-11-06 PROCEDURE — G0145 SCR C/V CYTO,THINLAYER,RESCR: HCPCS | Performed by: PATHOLOGY

## 2019-11-06 RX ORDER — NORETHINDRONE ACETATE AND ETHINYL ESTRADIOL 1; 20 MG/1; UG/1
1 TABLET ORAL DAILY
Refills: 2 | COMMUNITY
Start: 2019-08-29 | End: 2019-11-06 | Stop reason: SINTOL

## 2019-11-06 RX ORDER — LEVALBUTEROL TARTRATE 45 UG/1
1-2 AEROSOL, METERED ORAL EVERY 6 HOURS PRN
COMMUNITY
End: 2020-06-08 | Stop reason: SDUPTHER
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
## 2019-11-08 LAB
C TRACH DNA SPEC QL NAA+PROBE: NEGATIVE
N GONORRHOEA DNA SPEC QL NAA+PROBE: NEGATIVE

## 2019-11-12 LAB
LAB AP GYN PRIMARY INTERPRETATION: ABNORMAL
Lab: ABNORMAL
PATH INTERP SPEC-IMP: ABNORMAL

## 2019-11-18 LAB
HPV HR 12 DNA CVX QL NAA+PROBE: POSITIVE
HPV16 DNA CVX QL NAA+PROBE: NEGATIVE
HPV18 DNA CVX QL NAA+PROBE: NEGATIVE

## 2019-11-19 ENCOUNTER — TELEPHONE (OUTPATIENT)
Dept: OBGYN CLINIC | Facility: CLINIC | Age: 24
End: 2019-11-19

## 2019-11-19 NOTE — TELEPHONE ENCOUNTER
----- Message from Sabrina Arias MD sent at 11/19/2019 12:54 PM EST -----   Patient will need a colposcopy due to high risk HPV in addition to a atypical cells

## 2019-11-21 DIAGNOSIS — Z30.41 ENCOUNTER FOR SURVEILLANCE OF CONTRACEPTIVE PILLS: ICD-10-CM

## 2019-12-04 ENCOUNTER — PROCEDURE VISIT (OUTPATIENT)
Dept: OBGYN CLINIC | Facility: CLINIC | Age: 24
End: 2019-12-04
Payer: COMMERCIAL

## 2019-12-04 VITALS
SYSTOLIC BLOOD PRESSURE: 130 MMHG | WEIGHT: 129 LBS | HEIGHT: 67 IN | DIASTOLIC BLOOD PRESSURE: 70 MMHG | BODY MASS INDEX: 20.25 KG/M2

## 2019-12-04 DIAGNOSIS — R87.810 ASCUS WITH POSITIVE HIGH RISK HPV CERVICAL: Primary | ICD-10-CM

## 2019-12-04 DIAGNOSIS — R87.610 ASCUS WITH POSITIVE HIGH RISK HPV CERVICAL: Primary | ICD-10-CM

## 2019-12-04 PROCEDURE — 88305 TISSUE EXAM BY PATHOLOGIST: CPT | Performed by: PATHOLOGY

## 2019-12-04 PROCEDURE — 88342 IMHCHEM/IMCYTCHM 1ST ANTB: CPT | Performed by: PATHOLOGY

## 2019-12-04 PROCEDURE — 57454 BX/CURETT OF CERVIX W/SCOPE: CPT | Performed by: OBSTETRICS & GYNECOLOGY

## 2019-12-04 PROCEDURE — 88341 IMHCHEM/IMCYTCHM EA ADD ANTB: CPT | Performed by: PATHOLOGY

## 2019-12-04 RX ORDER — NORETHINDRONE ACETATE AND ETHINYL ESTRADIOL 1; 20 MG/1; UG/1
TABLET ORAL
Qty: 84 TABLET | Refills: 2 | Status: SHIPPED | OUTPATIENT
Start: 2019-12-04 | End: 2020-08-18 | Stop reason: ALTCHOICE

## 2019-12-04 NOTE — PROGRESS NOTES
Colposcopy  Date/Time: 12/4/2019 8:57 AM  Performed by: Krystin Hogan MD  Authorized by: Krystin Hogan MD     Consent:     Consent obtained:  Written    Consent given by:  Patient    Procedural risks discussed:  Possible continued pain, bleeding and infection    Patient questions answered: yes      Patient agrees, verbalizes understanding, and wants to proceed: yes      Educational handouts given: no      Instructions and paperwork completed: yes    Pre-procedure:     Pre-procedure timeout performed: yes      Prepped with: acetic acid    Indication:     Indication:  ASC-US  Procedure:     Procedure: Colposcopy w/ cervical biopsy and ECC      Under satisfactory analgesia the patient was prepped and draped in the dorsal lithotomy position: yes      [place] speculum was placed in the vagina: yes      Under colposcopic examination the transition zone was seen in entirety: yes      Intracervical block was performed: no      Endocervix was curetted using a Kevorkian curette: yes      Cervical biopsy performed with a cervical biopsy punch: yes      Tampon inserted: no      Monsel's solution was applied: yes      Biopsy(s): yes      Location:  6    Specimen to pathology: yes    Post-procedure:     Findings: White epithelium      Impression: Low grade cervical dysplasia    Comments:        Patient will be notified of the biopsy results when available  Additionally, the patient has been placed on a lower estrogen dose birth control pill which apparently has helped her greatly with regards to any type of headache that she was having during menstruation  She will continue on this till seen for her next annual visit

## 2019-12-12 DIAGNOSIS — Z30.011 ENCOUNTER FOR PRESCRIPTION OF ORAL CONTRACEPTIVES: ICD-10-CM

## 2019-12-23 PROCEDURE — 88304 TISSUE EXAM BY PATHOLOGIST: CPT | Performed by: PATHOLOGY

## 2019-12-24 ENCOUNTER — LAB REQUISITION (OUTPATIENT)
Dept: LAB | Facility: HOSPITAL | Age: 24
End: 2019-12-24
Payer: COMMERCIAL

## 2019-12-24 DIAGNOSIS — L72.0 EPIDERMAL CYST: ICD-10-CM

## 2020-06-08 ENCOUNTER — OFFICE VISIT (OUTPATIENT)
Dept: FAMILY MEDICINE CLINIC | Facility: CLINIC | Age: 25
End: 2020-06-08
Payer: COMMERCIAL

## 2020-06-08 VITALS
HEART RATE: 86 BPM | DIASTOLIC BLOOD PRESSURE: 88 MMHG | WEIGHT: 132 LBS | HEIGHT: 66 IN | RESPIRATION RATE: 12 BRPM | BODY MASS INDEX: 21.21 KG/M2 | SYSTOLIC BLOOD PRESSURE: 118 MMHG | TEMPERATURE: 98.5 F

## 2020-06-08 DIAGNOSIS — J45.20 MILD INTERMITTENT ASTHMA WITHOUT COMPLICATION: ICD-10-CM

## 2020-06-08 DIAGNOSIS — E55.9 VITAMIN D DEFICIENCY: ICD-10-CM

## 2020-06-08 DIAGNOSIS — G43.009 MIGRAINE WITHOUT AURA AND WITHOUT STATUS MIGRAINOSUS, NOT INTRACTABLE: ICD-10-CM

## 2020-06-08 DIAGNOSIS — R53.83 FATIGUE, UNSPECIFIED TYPE: Primary | ICD-10-CM

## 2020-06-08 DIAGNOSIS — R69 TAKING MEDICATION FOR CHRONIC DISEASE: ICD-10-CM

## 2020-06-08 PROBLEM — S01.01XA SCALP LACERATION: Status: RESOLVED | Noted: 2019-03-22 | Resolved: 2020-06-08

## 2020-06-08 PROBLEM — F51.04 PSYCHOPHYSIOLOGICAL INSOMNIA: Status: ACTIVE | Noted: 2020-04-21

## 2020-06-08 PROBLEM — S52.92XA FOREARM FRACTURES, BOTH BONES, CLOSED, LEFT, INITIAL ENCOUNTER: Status: RESOLVED | Noted: 2019-03-22 | Resolved: 2020-06-08

## 2020-06-08 PROBLEM — S52.202A FOREARM FRACTURES, BOTH BONES, CLOSED, LEFT, INITIAL ENCOUNTER: Status: RESOLVED | Noted: 2019-03-22 | Resolved: 2020-06-08

## 2020-06-08 PROBLEM — S06.361A TRAUMATIC HEMORRHAGE OF CEREBRUM WITH LOSS OF CONSCIOUSNESS OF 30 MINUTES OR LESS (HCC): Status: RESOLVED | Noted: 2019-03-22 | Resolved: 2020-06-08

## 2020-06-08 PROBLEM — H51.11 CONVERGENCE INSUFFICIENCY: Status: RESOLVED | Noted: 2019-03-29 | Resolved: 2020-06-08

## 2020-06-08 PROBLEM — S06.0X1A CONCUSSION WITH LOSS OF CONSCIOUSNESS OF 30 MINUTES OR LESS: Status: RESOLVED | Noted: 2019-03-29 | Resolved: 2020-06-08

## 2020-06-08 PROCEDURE — 1036F TOBACCO NON-USER: CPT | Performed by: PHYSICIAN ASSISTANT

## 2020-06-08 PROCEDURE — 3008F BODY MASS INDEX DOCD: CPT | Performed by: PHYSICIAN ASSISTANT

## 2020-06-08 PROCEDURE — 99214 OFFICE O/P EST MOD 30 MIN: CPT | Performed by: PHYSICIAN ASSISTANT

## 2020-06-08 RX ORDER — LEVALBUTEROL TARTRATE 45 UG/1
1-2 AEROSOL, METERED ORAL EVERY 6 HOURS PRN
Qty: 1 INHALER | Refills: 1 | Status: SHIPPED | OUTPATIENT
Start: 2020-06-08

## 2020-06-08 RX ORDER — AMITRIPTYLINE HYDROCHLORIDE 10 MG/1
30 TABLET, FILM COATED ORAL
COMMUNITY

## 2020-06-18 ENCOUNTER — TELEPHONE (OUTPATIENT)
Dept: FAMILY MEDICINE CLINIC | Facility: CLINIC | Age: 25
End: 2020-06-18

## 2020-06-19 ENCOUNTER — OFFICE VISIT (OUTPATIENT)
Dept: FAMILY MEDICINE CLINIC | Facility: CLINIC | Age: 25
End: 2020-06-19
Payer: COMMERCIAL

## 2020-06-19 VITALS
BODY MASS INDEX: 21.21 KG/M2 | HEART RATE: 92 BPM | TEMPERATURE: 98.1 F | SYSTOLIC BLOOD PRESSURE: 100 MMHG | WEIGHT: 132 LBS | RESPIRATION RATE: 12 BRPM | DIASTOLIC BLOOD PRESSURE: 78 MMHG | HEIGHT: 66 IN

## 2020-06-19 DIAGNOSIS — Z01.818 PRE-OP EVALUATION: Primary | ICD-10-CM

## 2020-06-19 DIAGNOSIS — M79.602 LEFT ARM PAIN: ICD-10-CM

## 2020-06-19 PROBLEM — G47.10 HYPERSOMNIA: Status: RESOLVED | Noted: 2018-10-22 | Resolved: 2020-06-19

## 2020-06-19 PROBLEM — F51.04 PSYCHOPHYSIOLOGICAL INSOMNIA: Status: RESOLVED | Noted: 2020-04-21 | Resolved: 2020-06-19

## 2020-06-19 PROBLEM — G47.00 INSOMNIA: Status: RESOLVED | Noted: 2018-10-22 | Resolved: 2020-06-19

## 2020-06-19 PROCEDURE — 99213 OFFICE O/P EST LOW 20 MIN: CPT | Performed by: PHYSICIAN ASSISTANT

## 2020-06-19 PROCEDURE — 3008F BODY MASS INDEX DOCD: CPT | Performed by: PHYSICIAN ASSISTANT

## 2020-06-19 PROCEDURE — 1036F TOBACCO NON-USER: CPT | Performed by: PHYSICIAN ASSISTANT

## 2020-06-22 LAB
25(OH)D3+25(OH)D2 SERPL-MCNC: 38.9 NG/ML (ref 30–100)
ALBUMIN SERPL-MCNC: 4.4 G/DL (ref 3.9–5)
ALBUMIN/GLOB SERPL: 1.9 {RATIO} (ref 1.2–2.2)
ALP SERPL-CCNC: 61 IU/L (ref 39–117)
ALT SERPL-CCNC: 13 IU/L (ref 0–32)
AST SERPL-CCNC: 13 IU/L (ref 0–40)
BASOPHILS # BLD AUTO: 0 X10E3/UL (ref 0–0.2)
BASOPHILS NFR BLD AUTO: 1 %
BILIRUB SERPL-MCNC: 0.4 MG/DL (ref 0–1.2)
BUN SERPL-MCNC: 10 MG/DL (ref 6–20)
BUN/CREAT SERPL: 12 (ref 9–23)
CALCIUM SERPL-MCNC: 9.5 MG/DL (ref 8.7–10.2)
CHLORIDE SERPL-SCNC: 103 MMOL/L (ref 96–106)
CO2 SERPL-SCNC: 21 MMOL/L (ref 20–29)
CREAT SERPL-MCNC: 0.86 MG/DL (ref 0.57–1)
EOSINOPHIL # BLD AUTO: 0.2 X10E3/UL (ref 0–0.4)
EOSINOPHIL NFR BLD AUTO: 3 %
ERYTHROCYTE [DISTWIDTH] IN BLOOD BY AUTOMATED COUNT: 12.2 % (ref 11.7–15.4)
GLOBULIN SER-MCNC: 2.3 G/DL (ref 1.5–4.5)
GLUCOSE SERPL-MCNC: 88 MG/DL (ref 65–99)
HCT VFR BLD AUTO: 40.8 % (ref 34–46.6)
HGB BLD-MCNC: 13.9 G/DL (ref 11.1–15.9)
IMM GRANULOCYTES # BLD: 0 X10E3/UL (ref 0–0.1)
IMM GRANULOCYTES NFR BLD: 0 %
LYMPHOCYTES # BLD AUTO: 2.3 X10E3/UL (ref 0.7–3.1)
LYMPHOCYTES NFR BLD AUTO: 38 %
MCH RBC QN AUTO: 30 PG (ref 26.6–33)
MCHC RBC AUTO-ENTMCNC: 34.1 G/DL (ref 31.5–35.7)
MCV RBC AUTO: 88 FL (ref 79–97)
MONOCYTES # BLD AUTO: 0.4 X10E3/UL (ref 0.1–0.9)
MONOCYTES NFR BLD AUTO: 6 %
NEUTROPHILS # BLD AUTO: 3.3 X10E3/UL (ref 1.4–7)
NEUTROPHILS NFR BLD AUTO: 52 %
PLATELET # BLD AUTO: 256 X10E3/UL (ref 150–450)
POTASSIUM SERPL-SCNC: 3.9 MMOL/L (ref 3.5–5.2)
PROT SERPL-MCNC: 6.7 G/DL (ref 6–8.5)
RBC # BLD AUTO: 4.64 X10E6/UL (ref 3.77–5.28)
SL AMB EGFR AFRICAN AMERICAN: 109 ML/MIN/1.73
SL AMB EGFR NON AFRICAN AMERICAN: 94 ML/MIN/1.73
SODIUM SERPL-SCNC: 140 MMOL/L (ref 134–144)
TSH SERPL DL<=0.005 MIU/L-ACNC: 3.74 UIU/ML (ref 0.45–4.5)
WBC # BLD AUTO: 6.2 X10E3/UL (ref 3.4–10.8)

## 2020-08-12 ENCOUNTER — OFFICE VISIT (OUTPATIENT)
Dept: SLEEP CENTER | Facility: CLINIC | Age: 25
End: 2020-08-12
Payer: COMMERCIAL

## 2020-08-12 VITALS
HEIGHT: 66 IN | SYSTOLIC BLOOD PRESSURE: 106 MMHG | WEIGHT: 129 LBS | DIASTOLIC BLOOD PRESSURE: 68 MMHG | BODY MASS INDEX: 20.73 KG/M2 | HEART RATE: 98 BPM

## 2020-08-12 DIAGNOSIS — G47.33 OSA (OBSTRUCTIVE SLEEP APNEA): Primary | ICD-10-CM

## 2020-08-12 DIAGNOSIS — R53.83 FATIGUE, UNSPECIFIED TYPE: ICD-10-CM

## 2020-08-12 DIAGNOSIS — J45.20 MILD INTERMITTENT ASTHMA WITHOUT COMPLICATION: ICD-10-CM

## 2020-08-12 DIAGNOSIS — F41.9 ANXIETY: ICD-10-CM

## 2020-08-12 DIAGNOSIS — G43.009 MIGRAINE WITHOUT AURA AND WITHOUT STATUS MIGRAINOSUS, NOT INTRACTABLE: ICD-10-CM

## 2020-08-12 DIAGNOSIS — G47.00 INSOMNIA, UNSPECIFIED TYPE: ICD-10-CM

## 2020-08-12 PROCEDURE — 1036F TOBACCO NON-USER: CPT | Performed by: INTERNAL MEDICINE

## 2020-08-12 PROCEDURE — 3008F BODY MASS INDEX DOCD: CPT | Performed by: INTERNAL MEDICINE

## 2020-08-12 PROCEDURE — 99214 OFFICE O/P EST MOD 30 MIN: CPT | Performed by: INTERNAL MEDICINE

## 2020-08-12 NOTE — PROGRESS NOTES
Follow-up Note - Sleep Center   William De Anda  31 y o  female  Dorian Pain  [id number]    I saw Jose Xiao in sleep clinic today for her sleep complaints & comorbidities  Patient had a diagnostic sleep study in October of 2018 and is here to review results / treatment options  The study demonstrated mild obstructive sleep apnea: AHI 5 7 /hour she had no supine sleep     No significant  snoring  was noted  Minimum oxygen saturation 87 %  and 0 9% of total sleep time was spent with saturations less than 90%  PFSH, Problem List, Medications & Allergies were reviewed in EMR  Interval changes: none reported  She  has a past medical history of Varicella  She has a current medication list which includes the following prescription(s): amitriptyline, fluticasone, junel 1/20, levalbuterol, and rizatriptan  ROS: constitutional, psychiatric, ENT, respiratory,CVS, GI, UGS, CNS, MSK, integumentary, endocrine, hematological reviewed  Significant for slight weight gain since her study  She reported no nasal symptoms  Asthma is controlled  She has feelings of anxiety and depression  Kayce Eastman HPI:  Study was undertaken because of headaches and constant fatigue  She has under care of [address] [place] [address] and since initiating amitriptyline, notes headaches have improved somewhat still occurs 2 to 3 times a week  She sleeps alone and is not aware of snoring or breathing difficulties during sleep  Sleep Routine:  She is spending 10 hours in bed and estimates getting 7-8 hours sleep  She takes approximately 2 hours to fall asleep and attributes to racing thoughts  She denied use of electronics in the bedroom  Sleep is interrupted around 2 times a night and at times she has difficulty falling back asleep  She awakens with the aid of an alarm and feels rested initially  However, she is tired within half an hour and feels groggy for the rest of the day     She has excessive drowsiness and naps when she has the opportunity  She rated herself at Total score: 7 /24 on the Solomon sleepiness scale  Habits:  reports that she has never smoked  She has never used smokeless tobacco  She reports current alcohol use  She reports that she does not use drugs  She uses caffeine until late afternoon  EXAM: /68   Pulse 98   Ht 5' 5 5" (1 664 m)   Wt 58 5 kg (129 lb)   BMI 21 14 kg/m²     Patient is well groomed; well appearing  Skin/Extrem: warm & dry; col & hydration normal; no edema  Psych: cooperativeand in no distress  Mental state appears anxious  CNS: Alert, orientated, clear & coherent speech  H&N: EOMI; NC/AT:no facial pressure marks, no rashes  ENMT Mucosa appearsnormal Nasal airway:patent  Oral airway:  crowded  Resp:effort is normal CVS: RRR ABD:  Soft, nontender MSK:Gait normal     IMPRESSION: Primary Sleep/Secondary(to Medical or Psych conditions) & comorbidities   1  ANDRÉS (obstructive sleep apnea)     2  Insomnia, unspecified type     3  Anxiety     4  Fatigue, unspecified type     5  Migraine without aura and without status migrainosus, not intractable     6  Mild intermittent asthma without complication       PLAN:    1  I reviewed results of the Sleep studies with the patient  2  With respect to above conditions, I counseled on pathophysiology, diagnosis, treatment options, risks and benefits; inter-relationship and effects on symptoms and comorbidities; risks of no treatment; costs and insurance aspects  3  Cognitive behavioral therapy was initiated, Sleep Hygiene and behavioral techniques to manage Insomnia were discussed  Specifically, limiting time in bed to 8 hours or less, avoiding daytime naps and caffeine use after 3:00 p m   I also advised on relaxation techniques  4  She may warrant psychiatric evaluation and treatment for anxiety/depression  5  She will consider her treatment options and is interested in mandibular advancement device across CPAP    She is moving to [place] and will follow up with a sleep specialist in the area  6  I have not scheduled any follow-up in Sleep Clinic at this time  Thank you for allowing me to participate in the care of this patient       Sincerely,    Authenticated electronically by Pollo Solis MD on 45/46/86   Board Certified Specialist

## 2020-08-12 NOTE — PATIENT INSTRUCTIONS

## 2020-08-18 ENCOUNTER — ANNUAL EXAM (OUTPATIENT)
Dept: OBGYN CLINIC | Facility: CLINIC | Age: 25
End: 2020-08-18
Payer: COMMERCIAL

## 2020-08-18 VITALS
TEMPERATURE: 97.7 F | SYSTOLIC BLOOD PRESSURE: 120 MMHG | WEIGHT: 129 LBS | BODY MASS INDEX: 20.73 KG/M2 | HEIGHT: 66 IN | DIASTOLIC BLOOD PRESSURE: 80 MMHG

## 2020-08-18 DIAGNOSIS — Z12.4 SCREENING FOR CERVICAL CANCER: ICD-10-CM

## 2020-08-18 DIAGNOSIS — Z30.41 ENCOUNTER FOR SURVEILLANCE OF CONTRACEPTIVE PILLS: ICD-10-CM

## 2020-08-18 DIAGNOSIS — Z01.419 ENCOUNTER FOR GYNECOLOGICAL EXAMINATION WITHOUT ABNORMAL FINDING: Primary | ICD-10-CM

## 2020-08-18 PROCEDURE — G0145 SCR C/V CYTO,THINLAYER,RESCR: HCPCS | Performed by: OBSTETRICS & GYNECOLOGY

## 2020-08-18 PROCEDURE — 1036F TOBACCO NON-USER: CPT | Performed by: OBSTETRICS & GYNECOLOGY

## 2020-08-18 PROCEDURE — 3008F BODY MASS INDEX DOCD: CPT | Performed by: OBSTETRICS & GYNECOLOGY

## 2020-08-18 PROCEDURE — 87624 HPV HI-RISK TYP POOLED RSLT: CPT | Performed by: OBSTETRICS & GYNECOLOGY

## 2020-08-18 PROCEDURE — S0612 ANNUAL GYNECOLOGICAL EXAMINA: HCPCS | Performed by: OBSTETRICS & GYNECOLOGY

## 2020-08-18 RX ORDER — RIZATRIPTAN BENZOATE 10 MG/1
10 TABLET ORAL
COMMUNITY
Start: 2020-08-14 | End: 2020-11-12

## 2020-08-18 RX ORDER — AMITRIPTYLINE HYDROCHLORIDE 10 MG/1
30 TABLET, FILM COATED ORAL DAILY
COMMUNITY
Start: 2020-08-14 | End: 2020-11-12

## 2020-08-18 NOTE — PROGRESS NOTES
CC:  Annual exam    HPI: Олег Whelan presents for routine gyn exam   Around is doing well and offers no complaints or concerns at this time  She has taken to using Loestrin for contraception  She is on Maxalt and amitriptyline for her migraines  She is doing much better as she notes her migraines are less intense and way less frequent  Past Medical History:  Past Medical History:   Diagnosis Date    Varicella        Past Surgical History:  Past Surgical History:   Procedure Laterality Date    ORIF ULNAR / RADIAL SHAFT FRACTURE Left 03/2019    WISDOM TOOTH EXTRACTION         Past OB/Gyn History:  Menstrual cycles every 28 days, with 3 days of light bleeding  Denies any history of sexually transmitted infection  No history of abnormal pap smears  Her last pap smear was last year and did show mild dysplasia      ALLERGIES:   Allergies   Allergen Reactions    Other Allergic Rhinitis, Sneezing and Wheezing     Cat dander       MEDS:   Current Outpatient Medications:     amitriptyline (ELAVIL) 10 mg tablet    fluticasone (FLONASE) 50 mcg/act nasal spray    rizatriptan (MAXALT) 10 MG tablet    amitriptyline (ELAVIL) 10 mg tablet    levalbuterol (XOPENEX HFA) 45 mcg/act inhaler    rizatriptan (MAXALT-MLT) 10 MG disintegrating tablet    Family History:  Family History   Problem Relation Age of Onset    No Known Problems Father     Asthma Other     No Known Problems Mother     Mental illness Neg Hx     Substance Abuse Neg Hx     Alcohol abuse Neg Hx        Social History:  Social History     Socioeconomic History    Marital status: Single     Spouse name: Not on file    Number of children: 0    Years of education: Not on file    Highest education level: Not on file   Occupational History    Not on file   Social Needs    Financial resource strain: Not on file    Food insecurity     Worry: Not on file     Inability: Not on file    Transportation needs     Medical: Not on file     Non-medical: Not on file   Tobacco Use    Smoking status: Never Smoker    Smokeless tobacco: Never Used   Substance and Sexual Activity    Alcohol use: Yes     Comment: occassional 1-2 beers on the weekend     Drug use: No    Sexual activity: Yes     Partners: Male     Birth control/protection: Pill   Lifestyle    Physical activity     Days per week: Not on file     Minutes per session: Not on file    Stress: Not on file   Relationships    Social connections     Talks on phone: Not on file     Gets together: Not on file     Attends Sabianist service: Not on file     Active member of club or organization: Not on file     Attends meetings of clubs or organizations: Not on file     Relationship status: Not on file    Intimate partner violence     Fear of current or ex partner: Not on file     Emotionally abused: Not on file     Physically abused: Not on file     Forced sexual activity: Not on file   Other Topics Concern    Not on file   Social History Narrative    Always uses seat belts    Caffeine use 1 cup coffee daily          Review of Systems:  Gen:   Denies fatigue, chills, nausea, vomiting, fever  Skin: No rashes or discolorations of any concern  RESP: Denies SOB, no cough  CV: Denies chest pain or palpitations  Breasts: Denies masses, pain, skin changes and nipple discharge  GI: Denies abdominal pain, heartburn, nausea, vomiting, changes in bowel habits  : Denies dysuria, frequency, CVA tenderness, incontinence and hematuria  Genitalia: Denies abnormal vaginal discharge, external lesions, rashes, pelvic pain, pressure, abnormal bleeding  Rectal:  Denies pain, bleeding, hemorrhoids,    Physical Exam:  /80 (BP Location: Left arm, Patient Position: Sitting, Cuff Size: Standard)   Temp 97 7 °F (36 5 °C)   Ht 5' 5 5" (1 664 m)   Wt 58 5 kg (129 lb)   LMP 08/04/2020   BMI 21 14 kg/m²    Gen: The patient was alert and oriented x3, pleasant well-appearing female in no acute distress     Neck: Unremarkable, no lymphadenopathy, no thyromegaly, or tenderness  CV:  RRR, no murmurs  Resp:  Clear to auscultation bilaterally, no wheezing  Breasts: Symmetric  No dominant, discrete, fixed  or suspicious masses are noted  No skin or nipple changes  No palpable axillary nodes  No supraclavicular adenopathy  Abd:  Soft, nontender, nondistended, no masses or organomegaly  Back:  No CVA tenderness, no tenderness to palpation along spine  Pelvic:  Normal appearing external female genitalia, no visible lesions, no rashes  Vagina is free of discharge, normal vaginal epithelium, no abnormal  lesions, no evidence of prolapse anteriorly or posteriorly  Normal appearing cervix, mobile and nontender  A thin prep pap smear was obtained today  Uterus is normal size, mobile and, nontender  No palpable adnexal masses or tenderness  No anoperineal lesions  Skin:  No concerning lesions  Extremeties: No edema      Assessment & Plan:   1  Routine annual exam      RTO one year orPRN  2  Encounter for cervical cancer screening, Pap smear obtained  3  Oral contraceptive surveillance, patient to continue on Loestrin

## 2020-08-20 LAB
HPV HR 12 DNA CVX QL NAA+PROBE: NEGATIVE
HPV16 DNA CVX QL NAA+PROBE: NEGATIVE
HPV18 DNA CVX QL NAA+PROBE: NEGATIVE

## 2020-08-24 LAB
LAB AP GYN PRIMARY INTERPRETATION: NORMAL
Lab: NORMAL

## 2020-08-26 DIAGNOSIS — Z30.41 ORAL CONTRACEPTIVE USE: Primary | ICD-10-CM

## 2020-08-26 RX ORDER — NORETHINDRONE ACETATE AND ETHINYL ESTRADIOL, ETHINYL ESTRADIOL AND FERROUS FUMARATE 1MG-10(24)
KIT ORAL
Qty: 84 TABLET | Refills: 3 | Status: SHIPPED | OUTPATIENT
Start: 2020-08-26

## (undated) DEVICE — BIT DRILL 1.5

## (undated) DEVICE — SOLN IRRIG .9%SOD 1000ML

## (undated) DEVICE — CUFF TOURNIQUET DISP 18 X 4

## (undated) DEVICE — SUTURE, ETHLON BLK MONO 3-0 1 8" FS-1 (36/BX)

## (undated) DEVICE — COVERS LIGHT HANDLE DISPOSABLE

## (undated) DEVICE — ***USE 138531*** SUTURE VICRYL 2-0 J266H CP-1 UNDYED

## (undated) DEVICE — COVER BACK TABLE ZONE-REINFORC

## (undated) DEVICE — MANIFOLD SINGLE PORT NEPTUNE

## (undated) DEVICE — DRAPE HALF STERILE

## (undated) DEVICE — Device

## (undated) DEVICE — TIP SUCTION FRAZIER 10FR

## (undated) DEVICE — DRILL BIT QUICK COUPLE BROWN 2

## (undated) DEVICE — COVER CAMERA LIGHT HANDLE

## (undated) DEVICE — CAST PADDING WEBRIL 4IN

## (undated) DEVICE — SYRINGE DISP LUER-LOK 20 CC

## (undated) DEVICE — CASTING ROLL PLASTER 3IN

## (undated) DEVICE — SYRINGE 10CC CONTROL LL

## (undated) DEVICE — HANDPIECE SUCTION INTERPULSE W/BONE CLEANING TIP

## (undated) DEVICE — ***USE 138572*** SUTURE ETHIBOND 2 X519H OS-4

## (undated) DEVICE — SUTURE VICRYL PLUS 2-0 VCP869H

## (undated) DEVICE — PENCIL HAND ELECTROSURGICAL

## (undated) DEVICE — CORD BIPOLAR CODMAN DISPOSABLE 10-CS

## (undated) DEVICE — SCREW CORTEX 3.5MMX12MM
Type: IMPLANTABLE DEVICE | Status: NON-FUNCTIONAL
Removed: 2019-03-23

## (undated) DEVICE — GLOVE SZ 8.5 LINER PROTEXIS PI BL

## (undated) DEVICE — DRAPE-U-1015

## (undated) DEVICE — SPONGE RAYTEC 8 X 4 16-PLY

## (undated) DEVICE — PAD GROUND ELECTROSURGICAL W/CORD

## (undated) DEVICE — GLOVE PROTEXIS PI ORTHO 8.5

## (undated) DEVICE — GAUZE XEROFORM 5X9 STERILE/OVERWRAPPED PACKET

## (undated) DEVICE — NEEDLE HYPODERMIC - PRO EDGE SAFETY DEVICE  22G X 1 1/2 NEED

## (undated) DEVICE — APPLICATOR CHLORAPREP 26ML ORANGE TINT

## (undated) DEVICE — DRILL BIT QUICK COUPLE 2.7MM

## (undated) DEVICE — BIT DRILL 2.0MM

## (undated) DEVICE — DRILL BIT 2.0MM/QC/125MM

## (undated) DEVICE — DRAPE EXTREMITY UNIVERSAL

## (undated) DEVICE — ***USE 143206***SYRINGE BULB

## (undated) DEVICE — SPONGE GAUZE 4X4 4 PLY-2S

## (undated) DEVICE — BIT DRILL 2.7MM THREE-FLUTED QC/125MM

## (undated) DEVICE — CAST PADDING WEBRIL 6IN